# Patient Record
Sex: FEMALE | Race: WHITE | NOT HISPANIC OR LATINO | Employment: OTHER | URBAN - METROPOLITAN AREA
[De-identification: names, ages, dates, MRNs, and addresses within clinical notes are randomized per-mention and may not be internally consistent; named-entity substitution may affect disease eponyms.]

---

## 2023-03-01 ENCOUNTER — MEDICAL CORRESPONDENCE (OUTPATIENT)
Dept: HEALTH INFORMATION MANAGEMENT | Facility: CLINIC | Age: 88
End: 2023-03-01

## 2023-03-03 ENCOUNTER — TRANSCRIBE ORDERS (OUTPATIENT)
Dept: OTHER | Age: 88
End: 2023-03-03

## 2023-03-03 DIAGNOSIS — K22.5 ZENKER DIVERTICULUM: Primary | ICD-10-CM

## 2023-03-06 ENCOUNTER — TELEPHONE (OUTPATIENT)
Dept: GASTROENTEROLOGY | Facility: CLINIC | Age: 88
End: 2023-03-06

## 2023-03-06 ENCOUNTER — DOCUMENTATION ONLY (OUTPATIENT)
Dept: GASTROENTEROLOGY | Facility: CLINIC | Age: 88
End: 2023-03-06

## 2023-03-06 NOTE — PROGRESS NOTES
Called [facility] to request that they push images to [employer] PACS. Faxed request per clinic protocol.    Images requested:   US THYROID/PARATHYROID (02/03/2023)  CT CHEST ABDOMEN PELVIS W (01/18/2023)    Clinic Information:  [facility]    [address] [place] [address]    Phone #: [phone]    Fax #: [phone] sk

## 2023-03-06 NOTE — TELEPHONE ENCOUNTER
Advanced Endoscopy     Referring provider: Eduardo Hernandez MD @ [facility]  Ph: [phone] Fx: [phone]    Referred to: Advanced Endoscopy Provider Group     Provider Requested: Dr Edouard     Referral Received: 3/3/23     Records received: CE    EGD 3/2/23 - imaging in the report in CE  Impression:            - No specimens collected.   - Large food bolus in upper esophagus, partially removed with Rat tooth forceps and Holt net.   - Due to difficulty navigating upper esophageal lumen,  decision made to terminate procedure in order to transfer to [facility] for advanced   endoscopic techniques.  Recommendation:        - We will arrange for transfer to [facility]. Strict NPO    Thyroid US 2/3/23  IMPRESSION:   1. Diffusely heterogeneous and hyperemic thyroid gland, Hashimoto thyroiditis suspected.   2. There are 2 adjacent TR 4 nodules in the inferior right lobe measuring 1.8 and 1.6 cm. Ultrasound FNA recommended.   ACR TI-RADS     CT CAP 1/18/23  Impression  1. Large hiatal hernia containing the entire stomach and a portion of the transverse colon.   2. Enlarged, nodular right thyroid lobe. Recommend thyroid ultrasound.   3. Very mild bronchiectasis.        Images received: PACs    Evaluation for:  Zenker diverticulum    Clinical History (per RN review):       [facility]     Please schedule with: Dr. Teresa Garrett Pritchard    Esophagram Needed? Yes  Ordered per protocol if no esophagram found within the last 12 months    Diagnosis: Hiatal hernia    NOTE: CT w/ entire stomach above diaphragm and some of transverse colon, patient with symptoms of bothersome gagging/mucous. EGD ordered.     MD review date:   MD Decision for clinic consultation/Orders:            Referral updates/Patient contacted:

## 2023-03-09 ENCOUNTER — PATIENT OUTREACH (OUTPATIENT)
Dept: GASTROENTEROLOGY | Facility: CLINIC | Age: 88
End: 2023-03-09
Payer: COMMERCIAL

## 2023-03-09 DIAGNOSIS — K22.5 ZENKER'S DIVERTICULUM: Primary | ICD-10-CM

## 2023-03-09 NOTE — PROGRESS NOTES
She'll need a swallow study with esophagram, clinic, then probably an EGD with Zenker's diverticulotomy.     Please assist in scheduling:     Procedure/Imaging/Clinic: EGD with Zenker's diverticulotomy and NG tube placement   Physician:  Javan   Timing: next available   Procedure length: 60 min   Anesthesia: Gen   Dx: Zenker's diverticulum   Tier: 3   Location: [facility] OR

## 2023-03-21 ENCOUNTER — PREP FOR PROCEDURE (OUTPATIENT)
Dept: GASTROENTEROLOGY | Facility: CLINIC | Age: 88
End: 2023-03-21
Payer: COMMERCIAL

## 2023-03-21 DIAGNOSIS — K22.5 ZENKER'S DIVERTICULUM: Primary | ICD-10-CM

## 2023-03-21 NOTE — PROGRESS NOTES
Pt's daughter Therese called and left  with request to discuss follow up with Dr Edouard. Called her back and left . Pt currently is scheduled for esophagram, video swallow study with SLP on 3/23. Clinic with Dr Edouard on June 7th.       Pt's daughter returned call. In agreement with above plan for imaging and clinic, would like to move in person clinic on 6/7 back to 9:40am. Message routed to clinic coordinators.    Also discussed tentatively scheduling procedure to follow. She understands that pt will be admitted for observation overnight following and will need a  the next day. Agreed upon June 26th for procedure    Patient needs to get pre-op physical completed. If outside  health system will need physical faxed to number [phone]   If you do not get a preop physical, your procedure could be cancelled, patient voiced understanding*    Preop Plan: Pt daughter will arrange to have this done with [facility] provider within 30 days of procedure date.    Does patient have any history of gastric bypass/gastric surgery/altered panc/bili anatomy?    Med Review    Blood thinner -    ASA -   Diabetic -     Patient Education r/t procedure:  [email or web address]    A pre-op nurse will call 1-2 days prior to the procedure.    NPO/Prep:   Will discuss in more detail at clinic, will include in written education letter sent to email    Verbalized understanding of all instructions. All questions answered.     Procedure order placed, message routed to OR      Tara Tejeda, RN, BSN,   Advanced Gastroenterology  Care coordinator

## 2023-03-23 ENCOUNTER — HOSPITAL ENCOUNTER (OUTPATIENT)
Dept: RADIOLOGY | Facility: HOSPITAL | Age: 88
Discharge: HOME | End: 2023-03-23
Attending: INTERNAL MEDICINE
Payer: COMMERCIAL

## 2023-03-23 ENCOUNTER — HOSPITAL ENCOUNTER (OUTPATIENT)
Dept: SPEECH THERAPY | Facility: HOSPITAL | Age: 88
Discharge: HOME | End: 2023-03-23
Attending: INTERNAL MEDICINE
Payer: COMMERCIAL

## 2023-03-23 DIAGNOSIS — K22.5 ZENKER'S DIVERTICULUM: ICD-10-CM

## 2023-03-23 PROCEDURE — 74221 X-RAY XM ESOPHAGUS 2CNTRST: CPT

## 2023-03-23 PROCEDURE — 250N000013 HC RX MED GY IP 250 OP 250 PS 637: Performed by: INTERNAL MEDICINE

## 2023-03-23 PROCEDURE — 92610 EVALUATE SWALLOWING FUNCTION: CPT | Mod: GN

## 2023-03-23 PROCEDURE — 74230 X-RAY XM SWLNG FUNCJ C+: CPT

## 2023-03-23 PROCEDURE — 92611 MOTION FLUOROSCOPY/SWALLOW: CPT | Mod: GN

## 2023-03-23 PROCEDURE — 92526 ORAL FUNCTION THERAPY: CPT | Mod: GN

## 2023-03-23 RX ADMIN — ANTACID/ANTIFLATULENT 4 G: 380; 550; 10; 10 GRANULE, EFFERVESCENT ORAL at 09:55

## 2023-03-23 NOTE — PROGRESS NOTES
Speech Pathology Video Swallow Study       03/23/23 3379   General Information   Type Of Visit Initial   Start Of Care Date 03/23/23   Referring Physician Patrick Romero MD   Orders Evaluate And Treat   Medical Diagnosis Zenker's Diverticulum   Onset Of Illness/injury Or Date Of Surgery 03/02/23   Pertinent History of Current Problem/OT: Additional Occupational Profile Info 87 yr old female with reports of coughing up phlegm. Recent EGD performed 3/2/23 with note results of: A Zenker's diverticulum with a large opening and a small amount of   impacted food was found. The esophagus was at the 3 o'clock position and   was accessed with the pediatric gastroscope.  A large hiatal hernia was present measuring at least 7 cm in length   (likely larger as the stomach was reduced into the chest when the scope   was initially advanced to the duodenum).  The stomach was normal.  The examined duodenum was normal. She was referred to GI and probable endoscopic Zenker diverticulotomy, which is scheduled for June 2023.   Respiratory Status Room air   General Observations Pt is alert and following directions, she requests that her daughter be present after the procedure for follow up discussion.   Patient/family Goals to further evaluate Zenkers diverticulum and hiatal hernia for treatment options   General Information Comments Patients daughter reports frequent coughing while eating particularly while drinking liquids.   Abuse Screen (yes response referral indicated)   Physical Signs of Abuse Present no   Clinical Swallow Evaluation   Oral Musculature generally intact   Structural Abnormalities none present   Dentition present and adequate   Mucosal Quality good   Mandibular Strength and Mobility intact   Oral Labial Strength and Mobility WFL   Lingual Strength and Mobility WFL   Velar Elevation intact   Buccal Strength and Mobility intact   Laryngeal Function Voicing initiated   Additional Documentation No   Additional  evaluation(s) completed today Yes   Rationale for completing additional evaluation instrumental assessment of swallow function and impact of Zenkers diverticulum   VFSS Evaluation   VFSS Additional Documentation Yes   VFSS Eval: Radiology   Radiologist Dr Blair   Views Taken left lateral;A/P   Physical Location of Procedure [facility]   VFSS Eval: Thin Liquid Texture Trial   Mode of Presentation, Thin Liquid cup;self-fed   Order of Presentation 1   Preparatory Phase WFL   Oral Phase, Thin Liquid Premature pharyngeal entry   Pharyngeal Phase, Thin Liquid UES dysfunction   Rosenbek's Penetration Aspiration Scale: Thin Liquid Trial Results 2 - contrast enters airway, remains above the vocal cords, no residue remains (penetration)   Diagnostic Statement Swallow triggers as the bolus passes the tip of the epiglottis. Hyolaryngeal elevation in intact, excursion is reduced. Epiglottic inversion is complete. There is a large Zenkers Diverticulum that fills with barium. No laryngeal penetration or aspiration occurs during the swallow, however, there is kickback of barium from the Zenkers diverticulum into the pharynx with shallow transient laryngeal penetration. Zenkers diverticulum is position to the left upon AP view.   VFSS Eval: Slightly Thick Liquids   Mode of Presentation cup;self-fed   Order of Presentation 2   Preparatory Phase WFL   Oral Phase Premature pharyngeal entry   Pharyngeal Phase UES dysfunction   Rosenbek's Penetration Aspiration Scale 7 - contrast passes glottis, visable residue remains despite patient's response   Response to Aspiration unproductive reflexive involuntary cough/throat clear   Diagnostic Statement Diverticulum was filled with previous thin liquid barium, with additional multiple bolus trials of slightly thick, there was continued kickback into the pharynx resulting in aspiration to the right bronchial branch, Patient demonstrated intermittent cough and throat clear but was  unable to fully eject aspirated material.   Educational Assessment   Barriers to Learning Cognitive   Preferred Learning Style Listening;Pictures/video;Other  (famly present to help with understanding and carryover)   Esophageal Phase of Swallow   Patient reports or presents with symptoms of esophageal dysphagia Yes   Esophageal comments see radiologists esophagram report   General Therapy Interventions   Planned Therapy Interventions Dysphagia Treatment   Dysphagia treatment Instruction of safe swallow strategies   Swallow Eval: Clinical Impressions   Skilled Criteria for Therapy Intervention Skilled criteria met.  Treatment indicated.   Treatment Diagnosis Zenkers Diverticulum   Diet texture recommendations Easy to Chew diet (level 7);Thin liquids (level 0)   Recommended Feeding/Eating Techniques maintain upright posture during/after eating for 30 mins;small sips/bites;other (see comments)  (eat several smaller meal rather than fewer larger meals)   Rehab Potential good, to achieve stated therapy goals   Therapy Frequency other (see comments)  (eval and 1 treatment)   Predicted Duration of Therapy Intervention (days/wks) 1 day   Risks and Benefits of Treatment have been explained. Yes   Patient, family and/or staff in agreement with Plan of Care Yes   Clinical Impression Comments Patient demonstrates no oral dysphagia. Swallow is timely and consistent with patients age. Hyolaryngeal excursion is reduced, however epiglottic inversion is complete and airway closure is achieved during the swallow. There is a large Zenkers diverticulum position to the left of the esophagus. Diverticulum fills with barium and shows repeated kickback of material into the pharynx. With continued intake the diverticulum does not clear and material  is regurgitated into the pharynx where there is laryngeal penetration and repeated aspiration. This can be exacerbated with coughing and hiatal hernia which pushes residual material upward.  Patient is at risk for persistant aspiration of food and liquid, food impaction in the Zenker's diverticulum, and aspiration related illness. See radiologist report for additional information and esophagram results. Zenker's diverticulotomy may improve bolus transition, reduce risk of aspiration, and improve nutritional intake.   Swallow Goals   SLP Swallow Goals 1   Swallow Goal 1   Goal Identifier Swallow strategies   Goal Description Patient and family will verbalize understanding of swallow strategies that my reduce the instances of aspiration and promote comfort/clearance of food/liquid threw the pharynx and esophagus.   Goal Progress Patient and her daughter able to verbalize understanding of swallow strategies until follow up with referring provider for next steps. Kayode needs reinforcement to recall and follow through, which is provided by her family.   Target Date 03/23/23   Date Met 03/23/23   Total Session Time   SLP Eval: VideoFluoroscopic Swallow function Minutes (15037) 20   Total Evaluation Time 20   Therapy Certification   Certification date from 03/23/23   Certification date to 03/23/23   Medical Diagnosis pharyngeal dysphagia, Zenkers divertiulum   Certification I certify the need for these services furnished under this plan of treatment and while under my care.  (Physician co-signature of this document indicates review and certification of the therapy plan).                                                                           [facility]          OUTPATIENT SWALLOW  EVALUATION  PLAN OF TREATMENT FOR OUTPATIENT REHABILITATION  (COMPLETE FOR INITIAL CLAIMS ONLY)  Patient's Last Name, First Name, M.I.  YOB: 1935  Manuel Romero     Provider's Name   [facility]   Medical Record No.  [id number]     Start of Care Date:  03/23/23   Onset Date:  03/02/23   Type:     ___PT   ____OT  ___X_SLP Medical Diagnosis:  (P) pharyngeal dysphagia,  Zenkers divertiulum     Treatment Diagnosis:  (P) Zenkers Diverticulum Visits from SOC:  1     _________________________________________________________________________________  Plan of Treatment/Functional Goals:  Planned Therapy Interventions: (P) Dysphagia Treatment  Dysphagia treatment: (P) Instruction of safe swallow strategies                     Goals   1. Goal Identifier: (P) Swallow strategies       Goal Description: (P) Patient and family will verbalize understanding of swallow strategies that my reduce the instances of aspiration and promote comfort/clearance of food/liquid threw the pharynx and esophagus.       Target Date: (P) 03/23/23       Date Met: (P) 03/23/23   2.                             3.                             4.                             5.                            6.                              7.                              8.                              Therapy Frequency: (P) other (see comments) (eval and 1 treatment)  Predicted Duration of Therapy Intervention (days/wks): (P) 1 day    Darrin Faye, SLP       I CERTIFY THE NEED FOR THESE SERVICES FURNISHED UNDER        THIS PLAN OF TREATMENT AND WHILE UNDER MY CARE     (Physician co-signature of this document indicates review and certification of the therapy plan).                Certification date from: (P) 03/23/23 Certification date to: (P) 03/23/23          Referring Physician: Patrick Romero MD    Initial Assessment        See Epic Evaluation Start Of Care Date: 03/23/23

## 2023-06-01 ENCOUNTER — DOCUMENTATION ONLY (OUTPATIENT)
Dept: GASTROENTEROLOGY | Facility: CLINIC | Age: 88
End: 2023-06-01
Payer: COMMERCIAL

## 2023-06-01 NOTE — PROGRESS NOTES
Called PT's daughter and left VM.     Called to remind patient of their upcoming appointment with our GI clinic, on 06/07/23 at 9:40 AM with Dr. Patrick Edouard. This appointment is scheduled as an in-person appt. Please arrive 15 minutes early to check in for your appointment. , if your appointment is virtual (video or telephone) you need to be in [place] for the visit. To reschedule or cancel patient to call [phone].      SK

## 2023-06-01 NOTE — PROGRESS NOTES
Patient called and confirmed their upcoming appointment with our GI clinic, on 06/07/23 at 9:40 AM with Dr. Patrick Edouard. This appointment is scheduled as an in-person appt. Please arrive 15 minutes early to check in for your appointment. , if your appointment is virtual (video or telephone) you need to be in [place] for the visit. To reschedule or cancel patient to call [phone].        SK

## 2023-06-05 ENCOUNTER — PREP FOR PROCEDURE (OUTPATIENT)
Dept: GASTROENTEROLOGY | Facility: CLINIC | Age: 88
End: 2023-06-05
Payer: COMMERCIAL

## 2023-06-07 ENCOUNTER — OFFICE VISIT (OUTPATIENT)
Dept: GASTROENTEROLOGY | Facility: CLINIC | Age: 88
End: 2023-06-07
Attending: INTERNAL MEDICINE
Payer: COMMERCIAL

## 2023-06-07 ENCOUNTER — PATIENT OUTREACH (OUTPATIENT)
Dept: GASTROENTEROLOGY | Facility: CLINIC | Age: 88
End: 2023-06-07

## 2023-06-07 VITALS
TEMPERATURE: 97.8 F | WEIGHT: 116.9 LBS | HEART RATE: 57 BPM | SYSTOLIC BLOOD PRESSURE: 159 MMHG | BODY MASS INDEX: 23.56 KG/M2 | RESPIRATION RATE: 16 BRPM | OXYGEN SATURATION: 95 % | DIASTOLIC BLOOD PRESSURE: 75 MMHG | HEIGHT: 59 IN

## 2023-06-07 DIAGNOSIS — K22.5 ZENKER DIVERTICULUM: ICD-10-CM

## 2023-06-07 PROBLEM — D04.39 SQUAMOUS CELL CARCINOMA IN SITU OF SKIN OF NOSE: Status: ACTIVE | Noted: 2021-03-03

## 2023-06-07 PROBLEM — L57.0 ACTINIC KERATOSIS: Status: ACTIVE | Noted: 2021-01-19

## 2023-06-07 PROBLEM — D04.61: Status: ACTIVE | Noted: 2021-10-11

## 2023-06-07 PROCEDURE — 99204 OFFICE O/P NEW MOD 45 MIN: CPT | Mod: GC | Performed by: INTERNAL MEDICINE

## 2023-06-07 PROCEDURE — G0463 HOSPITAL OUTPT CLINIC VISIT: HCPCS | Performed by: INTERNAL MEDICINE

## 2023-06-07 RX ORDER — VIT A/VIT C/VIT E/ZINC/COPPER 2148-113
1 TABLET ORAL DAILY
COMMUNITY
Start: 2023-01-12

## 2023-06-07 RX ORDER — LEVOTHYROXINE SODIUM 100 UG/1
TABLET ORAL
Status: ON HOLD | COMMUNITY
End: 2023-06-26

## 2023-06-07 ASSESSMENT — PAIN SCALES - GENERAL: PAINLEVEL: NO PAIN (0)

## 2023-06-07 NOTE — PROGRESS NOTES
INTERVENTIONAL ENDOSCOPY OUTPATIENT CLINIC CONSULT  DATE OF SERVICE: 6/7/2023  PROVIDER REQUESTING CONSULT: Eduardo Hernandez  Reason for Consultation: Endoscopic treatment of Zencker's diverticulum     ASSESSMENT:    87 y F with hypothyroidism, hiatal hernia, recently diagnosed Zencker's diverticulum who was referred for evaluation for flexible endoscopic septotomy.     Current symptoms include copious amounts of phlegm production especially at night time along with halitosis. She is s/p video swallow study and esophagogram. There is evidence of a 2.5 cm x 3 cm Zenker's diverticulum on esophagogram with radiographic evidence of aspiration into the right lower lobe.     We discussed about flexible endoscopic therapy for Zencker's diverticulum. I explained about the potential risks with the procedure including infection, bleeding, perforation with potential need for surgical repair (if needed). Pt and her daughters verbalized understanding this and are in agreement with the plan of treatment.     RECOMMENDATIONS:  -- EGD for cricopharyngeal septotomy        Thank you for this consultation.  It was a pleasure to participate in the care of this patient; please contact us with any further questions.  A total of 30 minutes was spent in face to face evaluation with this patient, of which was included chart review, history and exam, documentation, counseling, coordinating a management plan and further activities as noted above for this patient on the date of the encounter.     Shadi Lala MD on 6/7/2023 at 10:31 AM      Patrick Edouard MD  [facility]  Division of Gastroenterology and Hepatology  [address] [address]    ________________________________________________________________  HPI:  87 y F with hypothyroidism, hiatal hernia, recently diagnosed Zencker's diverticulum who was referred for evaluation for flexible endoscopic septotomy.     Pt is  accompanied by her two daughters. Over the past one to 1 1/2 year, she has been experiencing cough while eating and producing copious amounts of phlegm especially at night time. These symptoms are associated with halitosis.     Due to progressively worsening symptoms, she was evaluated by GI provider in [facility], underwent EGD on 3/2/23 which showed evidence of large food bolus in the upper esophagus consistent with Zencker's diverticulum with retrained food bolus  along with a large hiatal hernia.     Pt was then referred for advanced endoscopic treatment of Zencker's diverticulum.     Patient denies jaundice, abdominal distension, lower extremity edema, lethargy or confusion.  No history of melena, hematemesis or hematochezia.  Patient denies fevers, sweats, chills or weight loss.    PMHx:  No past medical history on file.  There is no problem list on file for this patient.      PSurgHx:  No past surgical history on file.    MEDS:  No current outpatient medications on file.     No current facility-administered medications for this visit.     ALLERGIES:  Not on File  FHx:No family history on file.    SOCIAL Hx:  Social History     Socioeconomic History     Marital status:      Spouse name: Not on file     Number of children: Not on file     Years of education: Not on file     Highest education level: Not on file   Occupational History     Not on file   Tobacco Use     Smoking status: Not on file     Smokeless tobacco: Not on file   Substance and Sexual Activity     Alcohol use: Not on file     Drug use: Not on file     Sexual activity: Not on file   Other Topics Concern     Not on file   Social History Narrative     Not on file     Social Determinants of Health     Financial Resource Strain: Not on file   Food Insecurity: Not on file   Transportation Needs: Not on file   Physical Activity: Not on file   Stress: Not on file   Social Connections: Not on file   Intimate Partner Violence: Not on file   Housing  Stability: Not on file       ROS: A comprehensive Review of Systems was asked and answered in the negative unless specifically commented upon in the HPI    Physical Exam  There were no vitals taken for this visit.  There is no height or weight on file to calculate BMI.     Gen: A&Ox3, NAD  HEENT: Moist mucus membranes, no scleral icterus.  Lungs: no respiratory distress  Abd: Non distended  Skin: no jaundice, no stigmata of chronic liver disease  Ext: warm, dry, no evidence of edema    LABS:  No results found for any previous visit.     No results found for: WBC, HGB, HCT, MCV, PLT    IMAGING:  No results found for this or any previous visit.        Endoscopies:  No results found for this or any previous visit.

## 2023-06-07 NOTE — LETTER
6/7/2023         RE: Manuel Romero  [address]  [facility] [address]        Dear Colleague,    Thank you for referring your patient, Manuel Romero, to the [facility]. Please see a copy of my visit note below.    INTERVENTIONAL ENDOSCOPY OUTPATIENT CLINIC CONSULT  DATE OF SERVICE: 6/7/2023  PROVIDER REQUESTING CONSULT: Eduardo Hernandez  Reason for Consultation: Endoscopic treatment of Zencker's diverticulum     ASSESSMENT:    87 y F with hypothyroidism, hiatal hernia, recently diagnosed Zencker's diverticulum who was referred for evaluation for flexible endoscopic septotomy.     Current symptoms include copious amounts of phlegm production especially at night time along with halitosis. She is s/p video swallow study and esophagogram. There is evidence of a 2.5 cm x 3 cm Zenker's diverticulum on esophagogram with radiographic evidence of aspiration into the right lower lobe.     We discussed about flexible endoscopic therapy for Zencker's diverticulum. I explained about the potential risks with the procedure including infection, bleeding, perforation with potential need for surgical repair (if needed). Pt and her daughters verbalized understanding this and are in agreement with the plan of treatment.     RECOMMENDATIONS:  -- EGD for cricopharyngeal septotomy        Thank you for this consultation.  It was a pleasure to participate in the care of this patient; please contact us with any further questions.  A total of 30 minutes was spent in face to face evaluation with this patient, of which was included chart review, history and exam, documentation, counseling, coordinating a management plan and further activities as noted above for this patient on the date of the encounter.     Shadi Lala MD on 6/7/2023 at 10:31 AM      Patrick Edouard MD  [facility]  Division of Gastroenterology and Hepatology  [facility] [address] [facility] [address]  [address]  [place], [place] [address]    ________________________________________________________________  HPI:  87 y F with hypothyroidism, hiatal hernia, recently diagnosed Zencker's diverticulum who was referred for evaluation for flexible endoscopic septotomy.     Pt is accompanied by her two daughters. Over the past one to 1 1/2 year, she has been experiencing cough while eating and producing copious amounts of phlegm especially at night time. These symptoms are associated with halitosis.     Due to progressively worsening symptoms, she was evaluated by GI provider in community, underwent EGD on 3/2/23 which showed evidence of large food bolus in the upper esophagus consistent with Zencker's diverticulum with retrained food bolus  along with a large hiatal hernia.     Pt was then referred for advanced endoscopic treatment of Zencker's diverticulum.     Patient denies jaundice, abdominal distension, lower extremity edema, lethargy or confusion.  No history of melena, hematemesis or hematochezia.  Patient denies fevers, sweats, chills or weight loss.    PMHx:  No past medical history on file.  There is no problem list on file for this patient.      PSurgHx:  No past surgical history on file.    MEDS:  No current outpatient medications on file.     No current facility-administered medications for this visit.     ALLERGIES:  Not on File  FHx:No family history on file.    SOCIAL Hx:  Social History     Socioeconomic History    Marital status:      Spouse name: Not on file    Number of children: Not on file    Years of education: Not on file    Highest education level: Not on file   Occupational History    Not on file   Tobacco Use    Smoking status: Not on file    Smokeless tobacco: Not on file   Substance and Sexual Activity    Alcohol use: Not on file    Drug use: Not on file    Sexual activity: Not on file   Other Topics Concern    Not on file   Social History Narrative    Not on file     Social Determinants of  Health     Financial Resource Strain: Not on file   Food Insecurity: Not on file   Transportation Needs: Not on file   Physical Activity: Not on file   Stress: Not on file   Social Connections: Not on file   Intimate Partner Violence: Not on file   Housing Stability: Not on file       ROS: A comprehensive Review of Systems was asked and answered in the negative unless specifically commented upon in the HPI    Physical Exam  There were no vitals taken for this visit.  There is no height or weight on file to calculate BMI.     Gen: A&Ox3, NAD  HEENT: Moist mucus membranes, no scleral icterus.  Lungs: no respiratory distress  Abd: Non distended  Skin: no jaundice, no stigmata of chronic liver disease  Ext: warm, dry, no evidence of edema    LABS:  No results found for any previous visit.     No results found for: WBC, HGB, HCT, MCV, PLT    IMAGING:  No results found for this or any previous visit.        Endoscopies:  No results found for this or any previous visit.      Attestation signed by Patrick Edouard MD at 6/7/2023  2:07 PM:  This patient has been seen and evaluated by me and discussed the management with Dr. Shadi Lala on 06/07/23. I reviewed the patient's clinical course, laboratory data, and radiographic imaging. I agree with the documented findings and plan of care as outlined.    I reviewed the EGD report, swallow study images and esophagram images. Patient has symptomatic Zenker's diverticulum. Large hiatal hernia but suspect symptoms really just from Zenker's which is supported by the swallow study findings showing reflux form the diverticulum. We discussed the flexible endoscopic Zenker's diverticulotomy/septotomy approach including risks. All questions answered and they would like to proceed.    Patrick Edouard MD  [facility]  Division of Gastroenterology and Hepatology  [facility] [address] [place], [place] [address]  Pager [phone]

## 2023-06-07 NOTE — NURSING NOTE
"Oncology Rooming Note    June 7, 2023 9:50 AM   Manuel Romero is a 87 year old female who presents for:    Chief Complaint   Patient presents with     New Patient     Zenker diverticulum      Initial Vitals: BP (!) 159/75   Pulse 57   Temp 97.8  F (36.6  C) (Tympanic)   Resp 16   Ht 1.507 m (4' 11.33\")   Wt 53 kg (116 lb 14.4 oz)   SpO2 95%   BMI 23.35 kg/m   Estimated body mass index is 23.35 kg/m  as calculated from the following:    Height as of this encounter: 1.507 m (4' 11.33\").    Weight as of this encounter: 53 kg (116 lb 14.4 oz). Body surface area is 1.49 meters squared.  No Pain (0) Comment: Data Unavailable   No LMP recorded. Patient has had a hysterectomy.  Allergies reviewed: Yes  Medications reviewed: Yes    Medications: Medication refills not needed today.  Pharmacy name entered into [facility]: [facility] [address] [place] [address]    Clinical concerns: new patient.        Lali Espinal CMA            "

## 2023-06-07 NOTE — TELEPHONE ENCOUNTER
Left VM on Therese's line to follow up after clinic. Will need pre op exam completed, which daughter stated would be done at [facility]. Will confirm pt not taking blood thinners or DM medications.  Need to discuss follow up visit, currently holding 8/9/23 at 7:40am    1620  Therese called back, does have pre op planned for next week at [facility]. Pt is not on blood thinners or diabetic meds.  In agreement with virtual visit on 8/9, ask that other daughter Darrin Chang, but contacted for the visit using  #[phone]    Message routed to clinic coordinators to schedule    Tara Tejeda, RN, BSN,   [facility]  Care coordinator

## 2023-06-07 NOTE — PATIENT INSTRUCTIONS
You will find a brief summary of your discussion and care plan from today's visit below.  Dr Edouard has outlined the following steps after your recent clinic visit:    RECOMMENDATIONS:  -- EGD for cricopharyngeal septotomy scheduled for 6/26/23    Please call with any questions or concerns regarding your clinic visit today.     It is a pleasure being involved in your health care.     Contacts post-consultation depending on your need:     Schedule Clinic Appointments                        [phone], option 1    Renetta Tejeda RN Care Coordinator           [phone]     Jn Lynch OR                           [phone]     GI Procedure Scheduling                               [phone], option 2     For urgent/emergent questions after business hours, you may reach the on-call GI Fellow by contacting the [facility]  at [phone].     How do I schedule labs, imaging studies, or procedures that were ordered in clinic today?      Labs: To schedule lab appointment at the Clinic and Surgery Center, use my chart or call [phone]. If you have a [place] lab closer to home where you are regularly seen you can give them a call.      Procedures: If a colonoscopy, upper endoscopy, breath test, esophageal manometry, or pH impedence was ordered today, our endoscopy team will call you to schedule this. If you have not heard from our endoscopy team within a week, please call [phone] to schedule.      Imaging Studies: If you were scheduled for a CT scan, X-ray, MRI, ultrasound, HIDA scan or other imaging study, please call [phone] to have this scheduled.      Referral: If a referral to another specialty was ordered, expect a phone call or follow instructions above. If you have not heard from anyone regarding your referral in a week, please call our clinic to check the status.      How to I schedule a follow-up visit?  If you did not schedule a follow-up visit today, please  call [phone] option #5 to schedule a follow-up office visit.      I recommend signing up for [employer] access if you have not already done so and are comfortable with using a computer.  This allows for online access to your lab results and also helps you communicate efficiently with the clinic should any questions arise in your care.

## 2023-06-26 ENCOUNTER — ANESTHESIA (OUTPATIENT)
Dept: SURGERY | Facility: CLINIC | Age: 88
DRG: 391 | End: 2023-06-26
Payer: COMMERCIAL

## 2023-06-26 ENCOUNTER — ANESTHESIA EVENT (OUTPATIENT)
Dept: SURGERY | Facility: CLINIC | Age: 88
DRG: 391 | End: 2023-06-26
Payer: COMMERCIAL

## 2023-06-26 ENCOUNTER — HOSPITAL ENCOUNTER (INPATIENT)
Facility: CLINIC | Age: 88
LOS: 3 days | Discharge: HOME | DRG: 391 | End: 2023-06-29
Attending: INTERNAL MEDICINE | Admitting: STUDENT IN AN ORGANIZED HEALTH CARE EDUCATION/TRAINING PROGRAM
Payer: COMMERCIAL

## 2023-06-26 ENCOUNTER — APPOINTMENT (OUTPATIENT)
Dept: GENERAL RADIOLOGY | Facility: CLINIC | Age: 88
DRG: 391 | End: 2023-06-26
Attending: INTERNAL MEDICINE
Payer: COMMERCIAL

## 2023-06-26 DIAGNOSIS — T17.908A ASPIRATION INTO AIRWAY, INITIAL ENCOUNTER: ICD-10-CM

## 2023-06-26 DIAGNOSIS — R13.10 DYSPHAGIA, UNSPECIFIED TYPE: ICD-10-CM

## 2023-06-26 DIAGNOSIS — R53.81 PHYSICAL DECONDITIONING: ICD-10-CM

## 2023-06-26 DIAGNOSIS — K22.5 ZENKER DIVERTICULUM: Primary | ICD-10-CM

## 2023-06-26 LAB
ANION GAP SERPL CALCULATED.3IONS-SCNC: 12 MMOL/L (ref 7–15)
ATRIAL RATE - MUSE: 63 BPM
BASOPHILS # BLD AUTO: 0 10E3/UL (ref 0–0.2)
BASOPHILS NFR BLD AUTO: 0 %
BUN SERPL-MCNC: 15.8 MG/DL (ref 8–23)
CALCIUM SERPL-MCNC: 9 MG/DL (ref 8.8–10.2)
CHLORIDE SERPL-SCNC: 107 MMOL/L (ref 98–107)
CREAT SERPL-MCNC: 0.64 MG/DL (ref 0.51–0.95)
DEPRECATED HCO3 PLAS-SCNC: 21 MMOL/L (ref 22–29)
DIASTOLIC BLOOD PRESSURE - MUSE: NORMAL MMHG
EOSINOPHIL # BLD AUTO: 0.1 10E3/UL (ref 0–0.7)
EOSINOPHIL NFR BLD AUTO: 1 %
ERYTHROCYTE [DISTWIDTH] IN BLOOD BY AUTOMATED COUNT: 12.9 % (ref 10–15)
GFR SERPL CREATININE-BSD FRML MDRD: 86 ML/MIN/1.73M2
GLUCOSE SERPL-MCNC: 113 MG/DL (ref 70–99)
HCT VFR BLD AUTO: 39.9 % (ref 35–47)
HGB BLD-MCNC: 13.3 G/DL (ref 11.7–15.7)
IMM GRANULOCYTES # BLD: 0.1 10E3/UL
IMM GRANULOCYTES NFR BLD: 1 %
INTERPRETATION ECG - MUSE: NORMAL
LYMPHOCYTES # BLD AUTO: 1.4 10E3/UL (ref 0.8–5.3)
LYMPHOCYTES NFR BLD AUTO: 14 %
MAGNESIUM SERPL-MCNC: 1.9 MG/DL (ref 1.7–2.3)
MCH RBC QN AUTO: 33 PG (ref 26.5–33)
MCHC RBC AUTO-ENTMCNC: 33.3 G/DL (ref 31.5–36.5)
MCV RBC AUTO: 99 FL (ref 78–100)
MONOCYTES # BLD AUTO: 0.6 10E3/UL (ref 0–1.3)
MONOCYTES NFR BLD AUTO: 6 %
NEUTROPHILS # BLD AUTO: 7.9 10E3/UL (ref 1.6–8.3)
NEUTROPHILS NFR BLD AUTO: 78 %
NRBC # BLD AUTO: 0 10E3/UL
NRBC BLD AUTO-RTO: 0 /100
P AXIS - MUSE: 6 DEGREES
PLATELET # BLD AUTO: 161 10E3/UL (ref 150–450)
POTASSIUM SERPL-SCNC: 3.9 MMOL/L (ref 3.4–5.3)
PR INTERVAL - MUSE: 154 MS
QRS DURATION - MUSE: 78 MS
QT - MUSE: 430 MS
QTC - MUSE: 440 MS
R AXIS - MUSE: -12 DEGREES
RBC # BLD AUTO: 4.03 10E6/UL (ref 3.8–5.2)
SODIUM SERPL-SCNC: 140 MMOL/L (ref 136–145)
SYSTOLIC BLOOD PRESSURE - MUSE: NORMAL MMHG
T AXIS - MUSE: -4 DEGREES
UPPER GI ENDOSCOPY: NORMAL
VENTRICULAR RATE- MUSE: 63 BPM
WBC # BLD AUTO: 10 10E3/UL (ref 4–11)

## 2023-06-26 PROCEDURE — 120N000002 HC R&B MED SURG/OB UMMC

## 2023-06-26 PROCEDURE — 80048 BASIC METABOLIC PNL TOTAL CA: CPT | Performed by: ANESTHESIOLOGY

## 2023-06-26 PROCEDURE — 250N000009 HC RX 250: Performed by: NURSE ANESTHETIST, CERTIFIED REGISTERED

## 2023-06-26 PROCEDURE — 250N000013 HC RX MED GY IP 250 OP 250 PS 637: Performed by: STUDENT IN AN ORGANIZED HEALTH CARE EDUCATION/TRAINING PROGRAM

## 2023-06-26 PROCEDURE — 0WJP8ZZ INSPECTION OF GASTROINTESTINAL TRACT, VIA NATURAL OR ARTIFICIAL OPENING ENDOSCOPIC APPROACH: ICD-10-PCS | Performed by: INTERNAL MEDICINE

## 2023-06-26 PROCEDURE — 36415 COLL VENOUS BLD VENIPUNCTURE: CPT | Performed by: ANESTHESIOLOGY

## 2023-06-26 PROCEDURE — 360N000082 HC SURGERY LEVEL 2 W/ FLUORO, PER MIN: Performed by: INTERNAL MEDICINE

## 2023-06-26 PROCEDURE — 99223 1ST HOSP IP/OBS HIGH 75: CPT | Mod: GC | Performed by: STUDENT IN AN ORGANIZED HEALTH CARE EDUCATION/TRAINING PROGRAM

## 2023-06-26 PROCEDURE — 85025 COMPLETE CBC W/AUTO DIFF WBC: CPT | Performed by: ANESTHESIOLOGY

## 2023-06-26 PROCEDURE — 710N000010 HC RECOVERY PHASE 1, LEVEL 2, PER MIN: Performed by: INTERNAL MEDICINE

## 2023-06-26 PROCEDURE — 93010 ELECTROCARDIOGRAM REPORT: CPT | Mod: 59 | Performed by: INTERNAL MEDICINE

## 2023-06-26 PROCEDURE — 258N000003 HC RX IP 258 OP 636: Performed by: STUDENT IN AN ORGANIZED HEALTH CARE EDUCATION/TRAINING PROGRAM

## 2023-06-26 PROCEDURE — 74018 RADEX ABDOMEN 1 VIEW: CPT | Mod: 26 | Performed by: RADIOLOGY

## 2023-06-26 PROCEDURE — 93005 ELECTROCARDIOGRAM TRACING: CPT

## 2023-06-26 PROCEDURE — 83735 ASSAY OF MAGNESIUM: CPT | Performed by: STUDENT IN AN ORGANIZED HEALTH CARE EDUCATION/TRAINING PROGRAM

## 2023-06-26 PROCEDURE — 250N000011 HC RX IP 250 OP 636: Performed by: STUDENT IN AN ORGANIZED HEALTH CARE EDUCATION/TRAINING PROGRAM

## 2023-06-26 PROCEDURE — 370N000017 HC ANESTHESIA TECHNICAL FEE, PER MIN: Performed by: INTERNAL MEDICINE

## 2023-06-26 PROCEDURE — 272N000001 HC OR GENERAL SUPPLY STERILE: Performed by: INTERNAL MEDICINE

## 2023-06-26 PROCEDURE — 999N000065 XR ABDOMEN PORT 1 VIEW

## 2023-06-26 PROCEDURE — C9113 INJ PANTOPRAZOLE SODIUM, VIA: HCPCS | Mod: JZ | Performed by: STUDENT IN AN ORGANIZED HEALTH CARE EDUCATION/TRAINING PROGRAM

## 2023-06-26 PROCEDURE — 258N000003 HC RX IP 258 OP 636: Performed by: NURSE ANESTHETIST, CERTIFIED REGISTERED

## 2023-06-26 PROCEDURE — 250N000011 HC RX IP 250 OP 636: Mod: JZ | Performed by: NURSE ANESTHETIST, CERTIFIED REGISTERED

## 2023-06-26 PROCEDURE — 250N000024 HC ISOFLURANE, PER MIN: Performed by: INTERNAL MEDICINE

## 2023-06-26 PROCEDURE — 250N000011 HC RX IP 250 OP 636: Performed by: NURSE ANESTHETIST, CERTIFIED REGISTERED

## 2023-06-26 PROCEDURE — 999N000141 HC STATISTIC PRE-PROCEDURE NURSING ASSESSMENT: Performed by: INTERNAL MEDICINE

## 2023-06-26 PROCEDURE — C1769 GUIDE WIRE: HCPCS | Performed by: INTERNAL MEDICINE

## 2023-06-26 RX ORDER — NALOXONE HYDROCHLORIDE 0.4 MG/ML
0.4 INJECTION, SOLUTION INTRAMUSCULAR; INTRAVENOUS; SUBCUTANEOUS
Status: DISCONTINUED | OUTPATIENT
Start: 2023-06-26 | End: 2023-06-29 | Stop reason: HOSPADM

## 2023-06-26 RX ORDER — ONDANSETRON 2 MG/ML
4 INJECTION INTRAMUSCULAR; INTRAVENOUS EVERY 30 MIN PRN
Status: DISCONTINUED | OUTPATIENT
Start: 2023-06-26 | End: 2023-06-26

## 2023-06-26 RX ORDER — CIPROFLOXACIN 2 MG/ML
INJECTION, SOLUTION INTRAVENOUS PRN
Status: DISCONTINUED | OUTPATIENT
Start: 2023-06-26 | End: 2023-06-26

## 2023-06-26 RX ORDER — HYDROMORPHONE HCL IN WATER/PF 6 MG/30 ML
0.4 PATIENT CONTROLLED ANALGESIA SYRINGE INTRAVENOUS EVERY 5 MIN PRN
Status: DISCONTINUED | OUTPATIENT
Start: 2023-06-26 | End: 2023-06-26

## 2023-06-26 RX ORDER — LEVOTHYROXINE SODIUM 75 UG/1
75 TABLET ORAL
Status: DISCONTINUED | OUTPATIENT
Start: 2023-06-27 | End: 2023-06-29 | Stop reason: HOSPADM

## 2023-06-26 RX ORDER — PROPOFOL 10 MG/ML
INJECTION, EMULSION INTRAVENOUS PRN
Status: DISCONTINUED | OUTPATIENT
Start: 2023-06-26 | End: 2023-06-26

## 2023-06-26 RX ORDER — FENTANYL CITRATE 50 UG/ML
50 INJECTION, SOLUTION INTRAMUSCULAR; INTRAVENOUS EVERY 5 MIN PRN
Status: DISCONTINUED | OUTPATIENT
Start: 2023-06-26 | End: 2023-06-26

## 2023-06-26 RX ORDER — FENTANYL CITRATE 50 UG/ML
INJECTION, SOLUTION INTRAMUSCULAR; INTRAVENOUS PRN
Status: DISCONTINUED | OUTPATIENT
Start: 2023-06-26 | End: 2023-06-26

## 2023-06-26 RX ORDER — LIDOCAINE 40 MG/G
CREAM TOPICAL
Status: DISCONTINUED | OUTPATIENT
Start: 2023-06-26 | End: 2023-06-26 | Stop reason: HOSPADM

## 2023-06-26 RX ORDER — ACETAMINOPHEN 650 MG/1
650 SUPPOSITORY RECTAL EVERY 4 HOURS PRN
Status: DISCONTINUED | OUTPATIENT
Start: 2023-06-26 | End: 2023-06-29 | Stop reason: HOSPADM

## 2023-06-26 RX ORDER — SODIUM CHLORIDE, SODIUM LACTATE, POTASSIUM CHLORIDE, CALCIUM CHLORIDE 600; 310; 30; 20 MG/100ML; MG/100ML; MG/100ML; MG/100ML
INJECTION, SOLUTION INTRAVENOUS CONTINUOUS
Status: DISCONTINUED | OUTPATIENT
Start: 2023-06-26 | End: 2023-06-26 | Stop reason: HOSPADM

## 2023-06-26 RX ORDER — SODIUM CHLORIDE, SODIUM LACTATE, POTASSIUM CHLORIDE, CALCIUM CHLORIDE 600; 310; 30; 20 MG/100ML; MG/100ML; MG/100ML; MG/100ML
INJECTION, SOLUTION INTRAVENOUS CONTINUOUS PRN
Status: DISCONTINUED | OUTPATIENT
Start: 2023-06-26 | End: 2023-06-26

## 2023-06-26 RX ORDER — HYDROMORPHONE HCL IN WATER/PF 6 MG/30 ML
0.2 PATIENT CONTROLLED ANALGESIA SYRINGE INTRAVENOUS EVERY 5 MIN PRN
Status: DISCONTINUED | OUTPATIENT
Start: 2023-06-26 | End: 2023-06-26

## 2023-06-26 RX ORDER — ONDANSETRON 4 MG/1
4 TABLET, ORALLY DISINTEGRATING ORAL EVERY 30 MIN PRN
Status: DISCONTINUED | OUTPATIENT
Start: 2023-06-26 | End: 2023-06-26

## 2023-06-26 RX ORDER — METRONIDAZOLE 500 MG/100ML
500 INJECTION, SOLUTION INTRAVENOUS EVERY 12 HOURS
Status: DISCONTINUED | OUTPATIENT
Start: 2023-06-26 | End: 2023-06-28

## 2023-06-26 RX ORDER — ACETAMINOPHEN 325 MG/10.15ML
650 LIQUID ORAL EVERY 4 HOURS PRN
Status: DISCONTINUED | OUTPATIENT
Start: 2023-06-26 | End: 2023-06-29 | Stop reason: HOSPADM

## 2023-06-26 RX ORDER — LIDOCAINE 40 MG/G
CREAM TOPICAL
Status: DISCONTINUED | OUTPATIENT
Start: 2023-06-26 | End: 2023-06-29 | Stop reason: HOSPADM

## 2023-06-26 RX ORDER — METRONIDAZOLE 500 MG/100ML
INJECTION, SOLUTION INTRAVENOUS PRN
Status: DISCONTINUED | OUTPATIENT
Start: 2023-06-26 | End: 2023-06-26

## 2023-06-26 RX ORDER — FENTANYL CITRATE 50 UG/ML
25 INJECTION, SOLUTION INTRAMUSCULAR; INTRAVENOUS EVERY 5 MIN PRN
Status: DISCONTINUED | OUTPATIENT
Start: 2023-06-26 | End: 2023-06-26

## 2023-06-26 RX ORDER — ONDANSETRON 2 MG/ML
INJECTION INTRAMUSCULAR; INTRAVENOUS PRN
Status: DISCONTINUED | OUTPATIENT
Start: 2023-06-26 | End: 2023-06-26

## 2023-06-26 RX ORDER — ONDANSETRON 2 MG/ML
4 INJECTION INTRAMUSCULAR; INTRAVENOUS EVERY 6 HOURS PRN
Status: DISCONTINUED | OUTPATIENT
Start: 2023-06-26 | End: 2023-06-26

## 2023-06-26 RX ORDER — ONDANSETRON 2 MG/ML
4 INJECTION INTRAMUSCULAR; INTRAVENOUS
Status: DISCONTINUED | OUTPATIENT
Start: 2023-06-26 | End: 2023-06-26 | Stop reason: HOSPADM

## 2023-06-26 RX ORDER — NALOXONE HYDROCHLORIDE 0.4 MG/ML
0.2 INJECTION, SOLUTION INTRAMUSCULAR; INTRAVENOUS; SUBCUTANEOUS
Status: DISCONTINUED | OUTPATIENT
Start: 2023-06-26 | End: 2023-06-29 | Stop reason: HOSPADM

## 2023-06-26 RX ORDER — CIPROFLOXACIN 2 MG/ML
400 INJECTION, SOLUTION INTRAVENOUS EVERY 12 HOURS
Status: DISCONTINUED | OUTPATIENT
Start: 2023-06-26 | End: 2023-06-28

## 2023-06-26 RX ORDER — LEVOTHYROXINE SODIUM 100 UG/1
100 TABLET ORAL
Status: DISCONTINUED | OUTPATIENT
Start: 2023-06-27 | End: 2023-06-26

## 2023-06-26 RX ORDER — PROCHLORPERAZINE MALEATE 5 MG
5 TABLET ORAL EVERY 6 HOURS PRN
Status: DISCONTINUED | OUTPATIENT
Start: 2023-06-26 | End: 2023-06-29 | Stop reason: HOSPADM

## 2023-06-26 RX ORDER — ONDANSETRON 4 MG/1
4 TABLET, ORALLY DISINTEGRATING ORAL EVERY 6 HOURS PRN
Status: DISCONTINUED | OUTPATIENT
Start: 2023-06-26 | End: 2023-06-26

## 2023-06-26 RX ORDER — LIDOCAINE HYDROCHLORIDE 20 MG/ML
INJECTION, SOLUTION INFILTRATION; PERINEURAL PRN
Status: DISCONTINUED | OUTPATIENT
Start: 2023-06-26 | End: 2023-06-26

## 2023-06-26 RX ORDER — ONDANSETRON 4 MG/1
4 TABLET, ORALLY DISINTEGRATING ORAL EVERY 6 HOURS PRN
Status: DISCONTINUED | OUTPATIENT
Start: 2023-06-26 | End: 2023-06-29 | Stop reason: HOSPADM

## 2023-06-26 RX ORDER — SODIUM CHLORIDE, SODIUM LACTATE, POTASSIUM CHLORIDE, CALCIUM CHLORIDE 600; 310; 30; 20 MG/100ML; MG/100ML; MG/100ML; MG/100ML
INJECTION, SOLUTION INTRAVENOUS CONTINUOUS
Status: DISCONTINUED | OUTPATIENT
Start: 2023-06-26 | End: 2023-06-26

## 2023-06-26 RX ORDER — ONDANSETRON 2 MG/ML
4 INJECTION INTRAMUSCULAR; INTRAVENOUS EVERY 6 HOURS PRN
Status: DISCONTINUED | OUTPATIENT
Start: 2023-06-26 | End: 2023-06-29 | Stop reason: HOSPADM

## 2023-06-26 RX ORDER — LEVOTHYROXINE SODIUM 75 UG/1
75 TABLET ORAL DAILY
COMMUNITY

## 2023-06-26 RX ORDER — SODIUM CHLORIDE, SODIUM LACTATE, POTASSIUM CHLORIDE, CALCIUM CHLORIDE 600; 310; 30; 20 MG/100ML; MG/100ML; MG/100ML; MG/100ML
INJECTION, SOLUTION INTRAVENOUS CONTINUOUS
Status: ACTIVE | OUTPATIENT
Start: 2023-06-26 | End: 2023-06-26

## 2023-06-26 RX ORDER — FLUMAZENIL 0.1 MG/ML
0.2 INJECTION, SOLUTION INTRAVENOUS
Status: ACTIVE | OUTPATIENT
Start: 2023-06-26 | End: 2023-06-26

## 2023-06-26 RX ADMIN — SODIUM CHLORIDE, POTASSIUM CHLORIDE, SODIUM LACTATE AND CALCIUM CHLORIDE: 600; 310; 30; 20 INJECTION, SOLUTION INTRAVENOUS at 11:08

## 2023-06-26 RX ADMIN — ACETAMINOPHEN 650 MG: 325 SOLUTION ORAL at 20:10

## 2023-06-26 RX ADMIN — Medication 50 MG: at 07:39

## 2023-06-26 RX ADMIN — PHENYLEPHRINE HYDROCHLORIDE 100 MCG: 10 INJECTION INTRAVENOUS at 08:25

## 2023-06-26 RX ADMIN — CIPROFLOXACIN 400 MG: 2 INJECTION INTRAVENOUS at 07:44

## 2023-06-26 RX ADMIN — SODIUM CHLORIDE, POTASSIUM CHLORIDE, SODIUM LACTATE AND CALCIUM CHLORIDE: 600; 310; 30; 20 INJECTION, SOLUTION INTRAVENOUS at 07:34

## 2023-06-26 RX ADMIN — PHENYLEPHRINE HYDROCHLORIDE 100 MCG: 10 INJECTION INTRAVENOUS at 07:54

## 2023-06-26 RX ADMIN — FENTANYL CITRATE 50 MCG: 50 INJECTION, SOLUTION INTRAMUSCULAR; INTRAVENOUS at 08:15

## 2023-06-26 RX ADMIN — LIDOCAINE HYDROCHLORIDE 100 MG: 20 INJECTION, SOLUTION INFILTRATION; PERINEURAL at 07:39

## 2023-06-26 RX ADMIN — PANTOPRAZOLE SODIUM 40 MG: 40 INJECTION, POWDER, FOR SOLUTION INTRAVENOUS at 13:19

## 2023-06-26 RX ADMIN — METRONIDAZOLE 500 MG: 500 INJECTION, SOLUTION INTRAVENOUS at 19:03

## 2023-06-26 RX ADMIN — METRONIDAZOLE 500 MG: 500 INJECTION, SOLUTION INTRAVENOUS at 07:34

## 2023-06-26 RX ADMIN — PHENYLEPHRINE HYDROCHLORIDE 100 MCG: 10 INJECTION INTRAVENOUS at 07:58

## 2023-06-26 RX ADMIN — ACETAMINOPHEN 650 MG: 325 SOLUTION ORAL at 13:20

## 2023-06-26 RX ADMIN — SUGAMMADEX 200 MG: 100 INJECTION, SOLUTION INTRAVENOUS at 08:30

## 2023-06-26 RX ADMIN — CIPROFLOXACIN 400 MG: 2 INJECTION, SOLUTION INTRAVENOUS at 20:10

## 2023-06-26 RX ADMIN — ONDANSETRON 4 MG: 2 INJECTION INTRAMUSCULAR; INTRAVENOUS at 08:27

## 2023-06-26 RX ADMIN — PANTOPRAZOLE SODIUM 40 MG: 40 INJECTION, POWDER, FOR SOLUTION INTRAVENOUS at 16:02

## 2023-06-26 RX ADMIN — PROPOFOL 100 MG: 10 INJECTION, EMULSION INTRAVENOUS at 07:39

## 2023-06-26 ASSESSMENT — ACTIVITIES OF DAILY LIVING (ADL)
DOING_ERRANDS_INDEPENDENTLY_DIFFICULTY: OTHER (SEE COMMENTS)
WALKING_OR_CLIMBING_STAIRS_DIFFICULTY: NO
CONCENTRATING,_REMEMBERING_OR_MAKING_DECISIONS_DIFFICULTY: NO
DIFFICULTY_EATING/SWALLOWING: NO
DRESSING/BATHING_DIFFICULTY: NO
ADLS_ACUITY_SCORE: 38
FALL_HISTORY_WITHIN_LAST_SIX_MONTHS: NO
VISION_MANAGEMENT: GLASSES
ADLS_ACUITY_SCORE: 35
ADLS_ACUITY_SCORE: 38
TOILETING_ISSUES: NO
ADLS_ACUITY_SCORE: 25
WEAR_GLASSES_OR_BLIND: YES
ADLS_ACUITY_SCORE: 35
ADLS_ACUITY_SCORE: 25
ADLS_ACUITY_SCORE: 25
CHANGE_IN_FUNCTIONAL_STATUS_SINCE_ONSET_OF_CURRENT_ILLNESS/INJURY: YES
ADLS_ACUITY_SCORE: 38
ADLS_ACUITY_SCORE: 25

## 2023-06-26 NOTE — PROGRESS NOTES
Admitted/transferred from:   2 RN full   skin assessment completed by Gabriela Smallwood RN and Chrissy Rudd.  Skin assessment finding: skin intact, no problems   Interventions/actions: skin interventions Repositioning     Will continue to monitor.

## 2023-06-26 NOTE — OP NOTE
Upper GI Endoscopy 06/26/2023  7:51 AM [address] [place] [address] [phone]     Endoscopy Department   _______________________________________________________________________________   Patient Name: Manuel Romero            Procedure Date: 6/26/2023 7:51 AM   MRN: [id number]                       Account Number: [id number]   YOB: 1935               Admit Type: Inpatient   Age: 87                               Room:  OR    Gender: Female                        Note Status: Finalized   Attending MD: CRYSTAL CHIN MD,      Pause for the Cause: time out performed   Total Sedation Time:                     _______________________________________________________________________________       Procedure:             Upper GI endoscopy   Indications:           For therapy of Zenker's diverticulum   Providers:             CRYSTAL CHIN MD   Referring MD:             Requesting Provider:   MARYLU ZUNIGA   Medicines:             General Anesthesia, Cipro 400 mg IV, Flagyl 500 mg IV   Complications:         No immediate complications. Estimated blood loss:                          Minimal.   _______________________________________________________________________________   Procedure:             Pre-Anesthesia Assessment:                          - Prior to the procedure, a History and Physical was                          performed, and patient medications and allergies were                          reviewed. The patient is competent. The risks and                          benefits of the procedure and the sedation options and                          risks were discussed with the patient. All questions                          were answered and informed consent was obtained.                          Patient identification and proposed procedure were                          verified by the physician, the nurse, the                           anesthesiologist and the anesthetist in the procedure                          room. Mental Status Examination: alert and oriented.                          Airway Examination: normal oropharyngeal airway and                          neck mobility. Respiratory Examination: clear to                          auscultation. CV Examination: normal. Prophylactic                          Antibiotics: The patient requires prophylactic                          antibiotics Zenker's diverticulotomy. Prior                          Anticoagulants: The patient has taken no anticoagulant                          or antiplatelet agents. ASA Grade Assessment: III - A                          patient with severe systemic disease. After reviewing                          the risks and benefits, the patient was deemed in                          satisfactory condition to undergo the procedure. The                          anesthesia plan was to use general anesthesia.                          Immediately prior to administration of medications,                          the patient was re-assessed for adequacy to receive                          sedatives. The heart rate, respiratory rate, oxygen                          saturations, blood pressure, adequacy of pulmonary                          ventilation, and response to care were monitored                          throughout the procedure. The physical status of the                          patient was re-assessed after the procedure.                          After obtaining informed consent, the endoscope was                          passed under direct vision. Throughout the procedure,                          the patient's blood pressure, pulse, and oxygen                          saturations were monitored continuously. The Endoscope                          was introduced through the mouth, and advanced to the                          second part of duodenum. The  Endoscope was introduced                          through the mouth, and advanced to the second part of                          duodenum. The upper GI endoscopy was accomplished                          without difficulty. The patient tolerated the                          procedure well.                                                                                     Findings:        A pediatric gastroscope was advanced from the left nare to the second        part of the duodenum. A ~2 cm Zenker's diverticulum was seen in the        hypopharynx. The remainder of the upper gastrointestinal tract as below.        A 12 Fr nasogastric tube was advanced over the guide wire into the        stomach to facilitate orientation of the esophageal lumen. Placement was        confirmed by scope visualization and the tube was secured to the nose        with a nasal bridal.        Adult gastroscope was then advanced back to the Zenker's        diverticulum/cricopharyngeal bar. The mucosal layer was first incised        using a standard SB knife (Endocut 3-1-1) to expose the cricopharyngeal        bar, which was then incised. Once it was felt that we reached the base        of the cricopharyngeus muscle/diverticulum dissection was stopped. The        base of the dissection was then closed with 3 hemoclips. No bleeding was        noted at the end of the procedure.        The examined duodenum was normal.        A large hiatal hernia was present.        NG slightly dislodged during extubation and had to be retracted to        remove coil in the mouth. NG tube now at 45 cm at the left nare.                                                                                     Impression:            - Normal examined duodenum.                          - Large hiatal hernia.                          - Zenker's diverticulum with prominent cricopharyngeal                          bar. NG tube placed for orientation and subsequent                           feeding. Cricopharyngeal myotomy/septotomy                          successfully performed as described above.                          - NG tube slightly dislodged at the end upon                          extubation and repositioned at the bedside.                          - No specimens collected.                          - MODIFER 22 given complexity of procedure requiring                          advanced techniques   Recommendation:        -- Monitor patient in PACU. Then admit for observation.                          - Abdominal X-ray in PACU to confirm NG tube placement                          (ordered)                          - Ice chips for comfort. Otherwise NPO.                          - Okay to use NG for nutrition and medication after                          X-ray confirmation.                          - No anticoagulation or antiplatelets for 72 hours.                          - Head of bed elevation to 30-45 degrees.                          - BID IV PPI while inpatient.                          - Continue Cipro/Flagyl IV while in the hospital until                          esophagram tomorrow.                          - IV pain control and antiemetics as needed                          - Esophagram in the morning to rule out a leak along                          with CBC and BMP. If no leak and otherwise clinically                          doing well, can discharge home. Pneumomediastinum and                          air within the pharyngeal spaces expected.                          - Liquid diet for three days starting tomorrow. Soft                          diet on day 4, 5,6. Regular diet starting next Friday                          as tolerated                          - Page Dr. Edouard directly or GI consult team for any                          clinical changes.                          - Patient will follow up with Dr. Edouard in 1 month                          - Findings  and recommendations were discussed with the                          patient and family.                                                                                       Patrick Edouard MD

## 2023-06-26 NOTE — PHARMACY-ADMISSION MEDICATION HISTORY
Pharmacist Admission Medication History    Admission medication history is complete. The information provided in this note is only as accurate as the sources available at the time of the update.    Medication reconciliation/reorder completed by provider prior to medication history? Yes    Information Source(s): [facility] via N/A    Pertinent Information: Fill history is up to date with levothyroxine and omeprazole    Changes made to PTA medication list:    Added: None    Deleted: None    Changed:   o Changed Levothyroxine 100 mcg to 75 mcg tablet (per [employer])  o Updated dose and direction for melatonin    Medication Affordability:       Allergies reviewed with patient and updates made in EHR: no    Medication History Completed By: Indio Diaz [facility] 6/26/2023 11:33 AM    Prior to Admission medications    Medication Sig Last Dose Taking? Auth Provider Long Term End Date   Cranberry 400 MG CAPS Take 1 capsule by mouth daily 6/24/2023 Yes Reported, Patient     levothyroxine (SYNTHROID/LEVOTHROID) 75 MCG tablet Take 75 mcg by mouth daily 6/24/2023 Yes Unknown, Entered By History Yes    melatonin 5 MG tablet Take 5 mg by mouth nightly as needed  Yes Reported, Patient     Multiple Vitamins-Minerals (PRESERVISION AREDS) TABS Take 1 tablet by mouth daily 6/24/2023 Yes Reported, Patient     omeprazole (PRILOSEC) 20 MG DR capsule TAKE 2 CAPSULES BY MOUTH ONCE DAILY BEFORE A MEAL 6/24/2023 Yes Reported, Patient

## 2023-06-26 NOTE — H&P
[facility]    History and Physical - Medicine Service, TONIE TEAM 2       Date of Admission:  6/26/2023    Assessment & Plan      Manuel Romero is a 87 year old female admitted on 6/26/2023. She has a history of Zenker's diverticulum and hypothyroidism and is admitted for post-operative management s/p Zenker's repair.    Zenker's diverticulum s/p cricopharyngeal myotomy/septotomy  Large hiatal hernia  Patient s/p Zenker's diverticulum procedure with GI 6/26 AM. Tolerated well. Admitted for observation overnight and evaluation for leak on POD1.   - Abdominal X-ray in PACU to confirm NG tube placement --> ok to use for meds  - Ice chips for comfort. Otherwise NPO.   - No anticoagulation or antiplatelets for 72 hours.   - Head of bed elevation to 30-45 degrees.   - BID IV PPI while inpatient.   - Continue Cipro/Flagyl IV while in the hospital until esophagram tomorrow.   - Tylenol PRN for pain; will escalate prn  - Esophagram in the morning to rule out a leak. If no leak, can discharge home.   - CBC and BMP in AM  - Liquid diet for three days starting tomorrow. Soft diet on day 4, 5,6. Regular diet starting next Friday as tolerated     Hypothyroidism  - PTA levothyroxine    PACs  Sinus bradycardia  Well perfused. EKG confirms PACs and sinus arrhythmia. No intervention if asx and well-perfused.        Diet: NPO for Medical/Clinical Reasons Except for: Ice Chips  DVT Prophylaxis: Pneumatic Compression Devices  Stinson Catheter: Not present  Fluids: LR  Lines: None     Cardiac Monitoring: None  Code Status: Full Code    Clinically Significant Risk Factors Present on Admission                                Disposition Plan      Expected Discharge Date: 06/27/2023                The patient's care was discussed with the Attending Physician, Dr. Aaron.      Hayley Severson, MD-MPH, PGY-2  Medicine Service, TONIE TEAM 2  [facility]  [place]  Securely message with igobubble (more info)  Text page via [facility] Paging/Directory   See signed in provider for up to date coverage information  ______________________________________________________________________    Chief Complaint   Post-operative Zenker's diverticulum management    History is obtained from the patient    History of Present Illness   Manuel Romero is a 87 year old female admitted on 6/26/2023. She has a history of Zenker's diverticulum and hypothyroidism and is admitted for post-operative management s/p Zenker's repair.    Patient tolerated the procedure well. No pain, nausea, abdominal pain, nausea, vomiting, diarrhea, headache, fever, chest pain, shortness of breath, cough, or other concerns. Patient stable post-operatively. Plan explained to patient and daughters. No questions or concerns.       Past Medical History    Past Medical History:   Diagnosis Date     Zenker's diverticulum        Past Surgical History   Past Surgical History:   Procedure Laterality Date     GYN SURGERY         Prior to Admission Medications   Prior to Admission Medications   Prescriptions Last Dose Informant Patient Reported? Taking?   Cranberry 400 MG CAPS 6/24/2023  Yes Yes   Sig: Take 1 capsule by mouth daily   Multiple Vitamins-Minerals (PRESERVISION AREDS) TABS 6/24/2023  Yes Yes   Sig: Take 1 tablet by mouth daily   levothyroxine (SYNTHROID/LEVOTHROID) 75 MCG tablet 6/24/2023  Yes Yes   Sig: Take 75 mcg by mouth daily   melatonin 5 MG tablet   Yes Yes   Sig: Take 5 mg by mouth nightly as needed   omeprazole (PRILOSEC) 20 MG DR capsule 6/24/2023  Yes Yes   Sig: TAKE 2 CAPSULES BY MOUTH ONCE DAILY BEFORE A MEAL      Facility-Administered Medications: None        Review of Systems    The 10 point Review of Systems is negative other than noted in the HPI or here.     Social History   I have reviewed this patient's social history and updated it with pertinent information if needed.  Social History     Tobacco  Use     Smoking status: Never     Smokeless tobacco: Never   Vaping Use     Vaping Use: Never used   Substance Use Topics     Alcohol use: Never     Drug use: Never       Family History   Did not obtain.     Allergies   Allergies   Allergen Reactions     Penicillins Rash     Sulfa Antibiotics      Codeine Nausea and Unknown     Morphine Nausea and Other (See Comments)        Physical Exam   Vital Signs: Temp: 97.8  F (36.6  C) Temp src: Oral BP: (!) 147/65 Pulse: 55   Resp: 19 SpO2: 92 % O2 Device: Nasal cannula Oxygen Delivery: 2 LPM  Weight: 117 lbs 15.14 oz    General Appearance: Alert, awake, well-appearing, in no acute distress  Respiratory: CTAB, no increased work of breathing, ETCO2 in place  Cardiovascular: Additional beats intermittently, no murmur noted  GI: Soft, non-tender, non-distended  Skin: No rashes or other lesions noted  Neuro: No focal deficits     Medical Decision Making     Please see A&P for additional details of medical decision making.      Data   ------------------------- PAST 24 HR DATA REVIEWED -----------------------------------------------   No

## 2023-06-26 NOTE — ANESTHESIA CARE TRANSFER NOTE
Patient: Manuel Romero    Procedure: Procedure(s):  ESOPHAGOGASTRODUODENOSCOPY (EGD)  with Zenker's diverticulotomy  INSERTION, NASOGASTRIC TUBE Latex Free       Diagnosis: Zenker's diverticulum [K22.5]  Diagnosis Additional Information: No value filed.    Anesthesia Type:   General     Note:    Oropharynx: oropharynx clear of all foreign objects and spontaneously breathing  Level of Consciousness: drowsy  Oxygen Supplementation: face mask  Level of Supplemental Oxygen (L/min / FiO2): 6  Independent Airway: airway patency satisfactory and stable  Dentition: dentition unchanged  Vital Signs Stable: post-procedure vital signs reviewed and stable  Report to RN Given: handoff report given  Patient transferred to: PACU    Handoff Report: Identifed the Patient, Identified the Reponsible Provider, Reviewed the pertinent medical history, Discussed the surgical course, Reviewed Intra-OP anesthesia mangement and issues during anesthesia, Set expectations for post-procedure period and Allowed opportunity for questions and acknowledgement of understanding      Vitals:  Vitals Value Taken Time   BP     Temp     Pulse 56 06/26/23 0847   Resp     SpO2 95 % 06/26/23 0847   Vitals shown include unvalidated device data.    Electronically Signed By: ELY Blum CRNA  June 26, 2023  8:47 AM

## 2023-06-26 NOTE — ANESTHESIA POSTPROCEDURE EVALUATION
Patient: Manuel Romero    Procedure: Procedure(s):  ESOPHAGOGASTRODUODENOSCOPY (EGD)  with Zenker's diverticulotomy  INSERTION, NASOGASTRIC TUBE Latex Free       Anesthesia Type:  General    Note:  Disposition: Outpatient   Postop Pain Control: Uneventful            Sign Out: Well controlled pain   PONV: No   Neuro/Psych: Uneventful            Sign Out: Acceptable/Baseline neuro status   Airway/Respiratory: Uneventful            Sign Out: Acceptable/Baseline resp. status   CV/Hemodynamics: Uneventful            Sign Out: Acceptable CV status; No obvious hypovolemia; No obvious fluid overload   Other NRE: NONE   DID A NON-ROUTINE EVENT OCCUR? No           Last vitals:  Vitals Value Taken Time   /77 06/26/23 0848   Temp     Pulse 61 06/26/23 0851   Resp     SpO2 97 % 06/26/23 0851   Vitals shown include unvalidated device data.    Electronically Signed By: Mane Alvarez MD  June 26, 2023  8:53 AM

## 2023-06-26 NOTE — ANESTHESIA PREPROCEDURE EVALUATION
Anesthesia Pre-Procedure Evaluation    Patient: Manuel Romero   MRN: [id number] : 1935        Procedure : Procedure(s):  ESOPHAGOGASTRODUODENOSCOPY (EGD)  with Zenker's diverticulotomy  INSERTION, NASOGASTRIC TUBE Latex Free          History reviewed. No pertinent past medical history.   Past Surgical History:   Procedure Laterality Date     GYN SURGERY        Allergies   Allergen Reactions     Penicillins Rash     Sulfa Antibiotics      Codeine Nausea and Unknown     Morphine Nausea and Other (See Comments)      Social History     Tobacco Use     Smoking status: Unknown     Smokeless tobacco: Not on file   Substance Use Topics     Alcohol use: Never      Wt Readings from Last 1 Encounters:   23 53.4 kg (117 lb 11.6 oz)        Anesthesia Evaluation   Pt has had prior anesthetic. Type: General.        ROS/MED HX  ENT/Pulmonary:       Neurologic:       Cardiovascular:       METS/Exercise Tolerance:     Hematologic:       Musculoskeletal:       GI/Hepatic:       Renal/Genitourinary:       Endo:     (+) thyroid problem,     Psychiatric/Substance Use:       Infectious Disease:       Malignancy:       Other:            Physical Exam    Airway        Mallampati: II    Neck ROM: limited     Respiratory Devices and Support         Dental       (+) Modest Abnormalities - crowns, retainers, 1 or 2 missing teeth      Cardiovascular   cardiovascular exam normal          Pulmonary   pulmonary exam normal                OUTSIDE LABS:  CBC: No results found for: WBC, HGB, HCT, PLT  BMP: No results found for: NA, POTASSIUM, CHLORIDE, CO2, BUN, CR, GLC  COAGS: No results found for: PTT, INR, FIBR  POC: No results found for: BGM, HCG, HCGS  HEPATIC: No results found for: ALBUMIN, PROTTOTAL, ALT, AST, GGT, ALKPHOS, BILITOTAL, BILIDIRECT, ARCHANA  OTHER: No results found for: PH, LACT, A1C, BAYLEE, PHOS, MAG, LIPASE, AMYLASE, TSH, T4, T3, CRP, SED    Anesthesia Plan    ASA Status:  3   NPO Status:  NPO Appropriate    Anesthesia  Type: General.     - Airway: ETT   Induction: Intravenous.   Maintenance: Inhalation.        Consents    Anesthesia Plan(s) and associated risks, benefits, and realistic alternatives discussed. Questions answered and patient/representative(s) expressed understanding.     - Discussed: Risks, Benefits and Alternatives for BOTH SEDATION and the PROCEDURE were discussed     - Discussed with:  Patient    Use of blood products discussed: Yes.     - Discussed with: Patient.     Postoperative Care    Pain management: IV analgesics.   PONV prophylaxis: Ondansetron (or other 5HT-3)     Comments:                Mane Alvarez MD

## 2023-06-26 NOTE — ANESTHESIA PROCEDURE NOTES
Airway       Patient location during procedure: OR       Procedure Start/Stop Times: 6/26/2023 7:42 AM  Staff -        CRNA: Corinna Garrison APRN CRNA       Performed By: CRNA  Consent for Airway        Urgency: elective  Indications and Patient Condition       Indications for airway management: saulo-procedural       Induction type:intravenous       Mask difficulty assessment: 1 - vent by mask    Final Airway Details       Final airway type: endotracheal airway       Successful airway: ETT - single  Endotracheal Airway Details        ETT size (mm): 6.5       Cuffed: yes       Successful intubation technique: direct laryngoscopy       DL Blade Type: Bass 2       Grade View of Cords: 1       Adjucts: stylet       Position: Right       Measured from: lips       Secured at (cm): 22       Bite Block used: Endo bite block.    Post intubation assessment        Placement verified by: capnometry and chest rise        Number of attempts at approach: 1       Secured with: pink tape       Ease of procedure: easy       Dentition: Intact and Unchanged    Medication(s) Administered   Medication Administration Time: 6/26/2023 7:42 AM

## 2023-06-26 NOTE — PROVIDER NOTIFICATION
Room 234 LILO  Patient has cardiac monitoring orders.  She does have an  irregular heart rate.  Please confirm this is needed.  We don't have cardiac monitoring on this floor.  Thanks  [phone]

## 2023-06-26 NOTE — PLAN OF CARE
"Goal Outcome Evaluation:      Plan of Care Reviewed With: patient  /43 (BP Location: Left arm)   Pulse 61   Temp 97.7  F (36.5  C) (Oral)   Resp 13   Ht 1.575 m (5' 2\")   Wt 53.5 kg (117 lb 15.1 oz)   SpO2 94%   BMI 21.57 kg/m       Patient arrived to  around 1030.  Alert and oriented x 4. Patient states pain is tolerable with Tylenol.  Abdomen soft with bowel sounds present, patient has not had BM this shift.  Voiding adequate amounts of urine. NG intact and clamped. Continue with POC.                  "

## 2023-06-27 ENCOUNTER — APPOINTMENT (OUTPATIENT)
Dept: GENERAL RADIOLOGY | Facility: CLINIC | Age: 88
DRG: 391 | End: 2023-06-27
Attending: INTERNAL MEDICINE
Payer: COMMERCIAL

## 2023-06-27 LAB
ALBUMIN SERPL BCG-MCNC: 3.4 G/DL (ref 3.5–5.2)
ALP SERPL-CCNC: 68 U/L (ref 35–104)
ALT SERPL W P-5'-P-CCNC: 6 U/L (ref 0–50)
ANION GAP SERPL CALCULATED.3IONS-SCNC: 10 MMOL/L (ref 7–15)
AST SERPL W P-5'-P-CCNC: 16 U/L (ref 0–45)
ATRIAL RATE - MUSE: 51 BPM
BILIRUB SERPL-MCNC: 1.2 MG/DL
BUN SERPL-MCNC: 9.2 MG/DL (ref 8–23)
CALCIUM SERPL-MCNC: 8.6 MG/DL (ref 8.8–10.2)
CHLORIDE SERPL-SCNC: 106 MMOL/L (ref 98–107)
CREAT SERPL-MCNC: 0.74 MG/DL (ref 0.51–0.95)
DEPRECATED HCO3 PLAS-SCNC: 24 MMOL/L (ref 22–29)
DIASTOLIC BLOOD PRESSURE - MUSE: NORMAL MMHG
ERYTHROCYTE [DISTWIDTH] IN BLOOD BY AUTOMATED COUNT: 12.9 % (ref 10–15)
GFR SERPL CREATININE-BSD FRML MDRD: 78 ML/MIN/1.73M2
GLUCOSE BLDC GLUCOMTR-MCNC: 91 MG/DL (ref 70–99)
GLUCOSE SERPL-MCNC: 108 MG/DL (ref 70–99)
HCT VFR BLD AUTO: 37.4 % (ref 35–47)
HGB BLD-MCNC: 12.3 G/DL (ref 11.7–15.7)
INTERPRETATION ECG - MUSE: NORMAL
MAGNESIUM SERPL-MCNC: 1.8 MG/DL (ref 1.7–2.3)
MCH RBC QN AUTO: 33.2 PG (ref 26.5–33)
MCHC RBC AUTO-ENTMCNC: 32.9 G/DL (ref 31.5–36.5)
MCV RBC AUTO: 101 FL (ref 78–100)
P AXIS - MUSE: 33 DEGREES
PHOSPHATE SERPL-MCNC: 3.4 MG/DL (ref 2.5–4.5)
PLATELET # BLD AUTO: 153 10E3/UL (ref 150–450)
POTASSIUM SERPL-SCNC: 3.7 MMOL/L (ref 3.4–5.3)
PR INTERVAL - MUSE: 140 MS
PROT SERPL-MCNC: 6.1 G/DL (ref 6.4–8.3)
QRS DURATION - MUSE: 76 MS
QT - MUSE: 448 MS
QTC - MUSE: 412 MS
R AXIS - MUSE: -11 DEGREES
RBC # BLD AUTO: 3.7 10E6/UL (ref 3.8–5.2)
SODIUM SERPL-SCNC: 140 MMOL/L (ref 136–145)
SYSTOLIC BLOOD PRESSURE - MUSE: NORMAL MMHG
T AXIS - MUSE: 19 DEGREES
VENTRICULAR RATE- MUSE: 51 BPM
WBC # BLD AUTO: 8.9 10E3/UL (ref 4–11)

## 2023-06-27 PROCEDURE — 250N000011 HC RX IP 250 OP 636: Mod: JZ | Performed by: STUDENT IN AN ORGANIZED HEALTH CARE EDUCATION/TRAINING PROGRAM

## 2023-06-27 PROCEDURE — 85027 COMPLETE CBC AUTOMATED: CPT | Performed by: STUDENT IN AN ORGANIZED HEALTH CARE EDUCATION/TRAINING PROGRAM

## 2023-06-27 PROCEDURE — 84100 ASSAY OF PHOSPHORUS: CPT

## 2023-06-27 PROCEDURE — 99232 SBSQ HOSP IP/OBS MODERATE 35: CPT | Mod: GC | Performed by: STUDENT IN AN ORGANIZED HEALTH CARE EDUCATION/TRAINING PROGRAM

## 2023-06-27 PROCEDURE — 250N000013 HC RX MED GY IP 250 OP 250 PS 637: Performed by: STUDENT IN AN ORGANIZED HEALTH CARE EDUCATION/TRAINING PROGRAM

## 2023-06-27 PROCEDURE — 83735 ASSAY OF MAGNESIUM: CPT | Performed by: STUDENT IN AN ORGANIZED HEALTH CARE EDUCATION/TRAINING PROGRAM

## 2023-06-27 PROCEDURE — 999N000128 HC STATISTIC PERIPHERAL IV START W/O US GUIDANCE

## 2023-06-27 PROCEDURE — C9113 INJ PANTOPRAZOLE SODIUM, VIA: HCPCS | Mod: JZ | Performed by: STUDENT IN AN ORGANIZED HEALTH CARE EDUCATION/TRAINING PROGRAM

## 2023-06-27 PROCEDURE — 258N000003 HC RX IP 258 OP 636: Performed by: STUDENT IN AN ORGANIZED HEALTH CARE EDUCATION/TRAINING PROGRAM

## 2023-06-27 PROCEDURE — 82962 GLUCOSE BLOOD TEST: CPT

## 2023-06-27 PROCEDURE — 36415 COLL VENOUS BLD VENIPUNCTURE: CPT | Performed by: STUDENT IN AN ORGANIZED HEALTH CARE EDUCATION/TRAINING PROGRAM

## 2023-06-27 PROCEDURE — 120N000002 HC R&B MED SURG/OB UMMC

## 2023-06-27 PROCEDURE — 74220 X-RAY XM ESOPHAGUS 1CNTRST: CPT

## 2023-06-27 PROCEDURE — 80053 COMPREHEN METABOLIC PANEL: CPT | Performed by: STUDENT IN AN ORGANIZED HEALTH CARE EDUCATION/TRAINING PROGRAM

## 2023-06-27 PROCEDURE — 74220 X-RAY XM ESOPHAGUS 1CNTRST: CPT | Mod: 26 | Performed by: RADIOLOGY

## 2023-06-27 PROCEDURE — 99222 1ST HOSP IP/OBS MODERATE 55: CPT | Performed by: DIETITIAN, REGISTERED

## 2023-06-27 RX ORDER — DEXTROSE MONOHYDRATE 100 MG/ML
INJECTION, SOLUTION INTRAVENOUS CONTINUOUS PRN
Status: DISCONTINUED | OUTPATIENT
Start: 2023-06-27 | End: 2023-06-29 | Stop reason: HOSPADM

## 2023-06-27 RX ORDER — LIDOCAINE 4 G/G
1 PATCH TOPICAL
Status: DISCONTINUED | OUTPATIENT
Start: 2023-06-27 | End: 2023-06-29 | Stop reason: HOSPADM

## 2023-06-27 RX ADMIN — LEVOTHYROXINE SODIUM 75 MCG: 75 TABLET ORAL at 08:09

## 2023-06-27 RX ADMIN — CIPROFLOXACIN 400 MG: 2 INJECTION, SOLUTION INTRAVENOUS at 20:55

## 2023-06-27 RX ADMIN — SODIUM CHLORIDE, POTASSIUM CHLORIDE, SODIUM LACTATE AND CALCIUM CHLORIDE 1000 ML: 600; 310; 30; 20 INJECTION, SOLUTION INTRAVENOUS at 09:40

## 2023-06-27 RX ADMIN — PANTOPRAZOLE SODIUM 40 MG: 40 INJECTION, POWDER, FOR SOLUTION INTRAVENOUS at 08:08

## 2023-06-27 RX ADMIN — METRONIDAZOLE 500 MG: 500 INJECTION, SOLUTION INTRAVENOUS at 07:04

## 2023-06-27 RX ADMIN — LIDOCAINE PATCH 4% 1 PATCH: 40 PATCH TOPICAL at 09:38

## 2023-06-27 RX ADMIN — METRONIDAZOLE 500 MG: 500 INJECTION, SOLUTION INTRAVENOUS at 19:23

## 2023-06-27 RX ADMIN — SODIUM CHLORIDE, POTASSIUM CHLORIDE, SODIUM LACTATE AND CALCIUM CHLORIDE 1000 ML: 600; 310; 30; 20 INJECTION, SOLUTION INTRAVENOUS at 16:53

## 2023-06-27 RX ADMIN — CIPROFLOXACIN 400 MG: 2 INJECTION, SOLUTION INTRAVENOUS at 08:12

## 2023-06-27 RX ADMIN — PANTOPRAZOLE SODIUM 40 MG: 40 INJECTION, POWDER, FOR SOLUTION INTRAVENOUS at 16:53

## 2023-06-27 ASSESSMENT — ACTIVITIES OF DAILY LIVING (ADL)
ADLS_ACUITY_SCORE: 25
ADLS_ACUITY_SCORE: 28
ADLS_ACUITY_SCORE: 25
ADLS_ACUITY_SCORE: 28
ADLS_ACUITY_SCORE: 25
ADLS_ACUITY_SCORE: 28
ADLS_ACUITY_SCORE: 25
ADLS_ACUITY_SCORE: 28
ADLS_ACUITY_SCORE: 25
ADLS_ACUITY_SCORE: 28

## 2023-06-27 NOTE — PHARMACY-CONSULT NOTE
Pharmacy Tube Feeding Consult    Medication reviewed for administration by feeding tube and for potential food/drug interactions.    Recommendation: Recommend holding tube feedings for 1 hours before and 1 hours after administration of levothyroxine..     Pharmacy will continue to follow as new medications are ordered.

## 2023-06-27 NOTE — PLAN OF CARE
Goal Outcome Evaluation:      Plan of Care Reviewed With: patient, family    Overall Patient Progress: improvingOverall Patient Progress: improving    Outcome Evaluation: see RD progress note

## 2023-06-27 NOTE — PLAN OF CARE
Problem: Plan of Care - These are the overarching goals to be used throughout the patient stay.    Goal: Absence of Hospital-Acquired Illness or Injury  Outcome: Progressing  Intervention: Identify and Manage Fall Risk  Recent Flowsheet Documentation  Taken 6/27/2023 0000 by Rachel Diana RN  Safety Promotion/Fall Prevention: activity supervised     B/P: 98/52, T: 98.4, P: 64, R: 14 No C/O pain. AOx2, needs reorienting. Using call light appropriately. Up assist of 1 to bathroom. Appeared to sleep in between cares. Continue to monitor and notify MD with any significant changes.

## 2023-06-27 NOTE — CONSULTS
Gastroenterology Consultation      Date of Admission:  6/26/2023  Reason for Admission: Zenker's diverticulum  Date of Consult  6/27/2023   Requesting Physician:  Nadja Christy MD           ASSESSMENT AND RECOMMENDATIONS:   Assessment:  87 year old female with a history of hypothyroidism, hiatal hernia, GERD and Zenker's diverticulum.  Admitted 6/26 for monitoring s/p Zenker's diverticulotomy.    #Zenker's diverticulum - s/p diverticulotomy (6/26/2023)  #Hx GERD  Patient initially presented with sx of copious phlegm at HS with halitosis and found to have Zenker's diverticulum on VSS and esophagram as well as radiographic e/o aspiration into RLL.  Now s/p Zenker's diverticulotomy on 6/26.  Post procedure soft pressures with NPO status and no IVF/nor TF infusing via NGT.  Remains on 2 L N.C. overnight for sats 92% but afebrile/no leukocytosis and without s/sx of resp distress.  Awaiting XR esophagram to r/o leak post procedure.  Note expect pneumomediastinum and air within the pharyngeal spaces.    Recommendations:  -- IVF bolus now, MIVF while remains NPO.  -- Follow for XR Esophagram completion/read. If no leak and otherwise clinically doing well, can discharge home. Pneumomediastinum and air within the pharyngeal spaces expected.   -NPO until esophagram completed. If without e/o leak begin the following diet progression:   -Begin FL diet x3 days (6/27-6/30) then,   -Soft diet x3 days (7/1-7/3) then,   -Regular diet starting 7/4  -NGT for meds/nutrition as needed.     -If able to advance diet, remove NGT.   -If unable to resume diet would consult RD to begin TF.  -No anticoagulation or antiplatelets for 72 hours.  -IV PPI BID while inpatient.  Resume PTA Omeprazold 20 mg BID (per pt this is what she takes) upon discharge.  -Continue IV Cipro/Flagyl while remains inpatient.  Can discontinue at discharge.  -Analgesia and antiemetics per primary team.    Discussed with primary medicine team.    Gastroenterology follow  up recommendations: (we will arrange)  Follow up with Dr. Edouard in 1 month.    Thank you for involving us in this patient's care. Please do not hesitate to contact the GI service with any questions or concerns.     Pt seen and care plan discussed with Dr. Edouard, GI staff physician.    Overall time spent on the date of this encounter preparing to see the patient (including chart review of available notes, clinical status events, imaging and labs); obtaining and/or reviewing separately obtained history; ordering medications, tests or procedures; communicating with other health care professionals; and documenting the above clinical information in the electronic medical record was 60 minutes.    Lali Joshua PA-C  GI Service  [facility]  Text Page  -------------------------------------------------------------------------------------------------------------------       Reason for Consultation:   Post Zenker's diverticulotomy         History of Present Illness:   Manuel Romero is a 87 year old female with a history of hypothyroidism, hiatal hernia, GERD and Zenker's diverticulum.  Admitted 6/26 for monitoring s/p Zenker's diverticulotomy.    Patient seen and examined at 09:00. History is obtained from patient and dtrs.  Reports was having increased pooling of secretions/phlegm in throat (hector worse at HS) as well as worsening halitosis that brought her into her PCP who ordered VSS/esophagram which revealed Zenker's diverticulum.  Additionally reports hx of GERD and was taking omeprazole 20 mg daily but was having increased sx of reflux (thought d/t large hiatal hernia) and recently increased to BID dosing to better control sx.  Denies any changes to appetite, dysphagia, N/V, unintentional weight losses PTA.      Currently reports some phlegm in throat but feels significantly decreased and managing own secretions well, swallowing without pain.  Denies fever, chills, CP, SOB, dysphagia,  N/V.       Procedures:  6/26/2023: EGD under GA with Dr. Edouard  Indications:  For therapy of Zenker's diverticulum                                                                          Findings:        A pediatric gastroscope was advanced from the left nare to the second        part of the duodenum. A ~2 cm Zenker's diverticulum was seen in the        hypopharynx. The remainder of the upper gastrointestinal tract as below.        A 12 Fr nasogastric tube was advanced over the guide wire into the        stomach to facilitate orientation of the esophageal lumen. Placement was        confirmed by scope visualization and the tube was secured to the nose        with a nasal bridal.        Adult gastroscope was then advanced back to the Zenker's        diverticulum/cricopharyngeal bar. The mucosal layer was first incised        using a standard SB knife (Endocut 3-1-1) to expose the cricopharyngeal        bar, which was then incised. Once it was felt that we reached the base        of the cricopharyngeus muscle/diverticulum dissection was stopped. The        base of the dissection was then closed with 3 hemoclips. No bleeding was        noted at the end of the procedure.        The examined duodenum was normal.        A large hiatal hernia was present.        NG slightly dislodged during extubation and had to be retracted to        remove coil in the mouth. NG tube now at 45 cm at the left nare.     Impression:      - Normal examined duodenum.                          - Large hiatal hernia.                          - Zenker's diverticulum with prominent cricopharyngeal                          bar. NG tube placed for orientation and subsequent                          feeding. Cricopharyngeal myotomy/septotomy                          successfully performed as described above.                          - NG tube slightly dislodged at the end upon                          extubation and repositioned at the bedside.                           - No specimens collected.                          - MODIFER 22 given complexity of procedure requiring                          advanced techniques             Past Medical History:   Reviewed and edited as appropriate  Past Medical History:   Diagnosis Date     Zenker's diverticulum             Past Surgical History:   Reviewed and edited as appropriate   Past Surgical History:   Procedure Laterality Date     ESOPHAGOSCOPY, GASTROSCOPY, DUODENOSCOPY (EGD), COMBINED N/A 6/26/2023    Procedure: ESOPHAGOGASTRODUODENOSCOPY (EGD)  with Zenker's diverticulotomy;  Surgeon: Patrick Edouard MD;  Location: [facility] OR     GYN SURGERY       INSERT TUBE NASOGASTRIC N/A 6/26/2023    Procedure: INSERTION, NASOGASTRIC TUBE Latex Free;  Surgeon: Patrick Edouard MD;  Location:  OR            Social History:   .  Lives in [place], [place].  2 daughters.   Alcohol: Denies  Tobacco: Denies  Illicit drugs: Denies         Family History:   Patient's family history is reviewed today and is non-contributory    History reviewed. No pertinent family history.          Allergies:   Reviewed and edited as appropriate     Allergies   Allergen Reactions     Penicillins Rash     Sulfa Antibiotics      Codeine Nausea and Unknown     Morphine Nausea and Other (See Comments)          Medications:     Current Facility-Administered Medications   Medication     acetaminophen (TYLENOL) solution 650 mg    Or     acetaminophen (TYLENOL) Suppository 650 mg     ciprofloxacin (CIPRO) infusion 400 mg     levothyroxine (SYNTHROID/LEVOTHROID) tablet 75 mcg     lidocaine (LMX4) cream     lidocaine 1 % 0.1-1 mL     melatonin tablet 1 mg     metroNIDAZOLE (FLAGYL) infusion 500 mg     naloxone (NARCAN) injection 0.2 mg     naloxone (NARCAN) injection 0.2 mg     naloxone (NARCAN) injection 0.4 mg     naloxone (NARCAN) injection 0.4 mg     ondansetron (ZOFRAN ODT) ODT tab 4 mg    Or     ondansetron (ZOFRAN) injection 4 mg     pantoprazole (PROTONIX)  IV push injection 40 mg     prochlorperazine (COMPAZINE) injection 5 mg    Or     prochlorperazine (COMPAZINE) tablet 5 mg     sodium chloride (PF) 0.9% PF flush 3 mL     sodium chloride (PF) 0.9% PF flush 3 mL             Review of Systems:     A complete review of systems was performed and is negative except as noted in the HPI           Physical Exam:   Temp: 98.4  F (36.9  C) Temp src: Oral BP: 98/52 Pulse: 64   Resp: 14 SpO2: 92 % O2 Device: Nasal cannula Oxygen Delivery: 2 LPM  Wt:   Wt Readings from Last 2 Encounters:   06/26/23 53.5 kg (117 lb 15.1 oz)   06/07/23 53 kg (116 lb 14.4 oz)      General: Pleasant elderly female in NAD.  Answers appropriately.    HEENT: Head is AT/NC. Sclera anicteric. No conjunctival injection.  Oropharynx is clear, moist and w/o exudate or lesions. No thrush. Good dentition.  NGT (12 Fr) in nare/bridled in place.  Lungs: Non-labored breathing on 2 L N.C. satting 95%. No crepitus on palpation.  Heart: Regular rate and rhythm.   Abdomen: Soft, non-tender, non-distended.  No rebound or peritoneal signs.  Extremities: WWP, no pedal edema.  MSK: no gross deformity, no muscle wasting  Skin: No jaundice or rash  Neurologic: Grossly non-focal.  CN 2-12 grossly intact.   Psych: mood appropriate to situation           Data:   Labs and imaging below were independently reviewed and interpreted    LAB WORK:    BMP  Recent Labs   Lab 06/27/23  0610 06/26/23  0701    140   POTASSIUM 3.7 3.9   CHLORIDE 106 107   BAYLEE 8.6* 9.0   CO2 24 21*   BUN 9.2 15.8   CR 0.74 0.64   * 113*     CBC  Recent Labs   Lab 06/27/23  0610 06/26/23  0701   WBC 8.9 10.0   RBC 3.70* 4.03   HGB 12.3 13.3   HCT 37.4 39.9   * 99   MCH 33.2* 33.0   MCHC 32.9 33.3   RDW 12.9 12.9    161     INRNo lab results found in last 7 days.  LFTs  Recent Labs   Lab 06/27/23  0610   ALKPHOS 68   AST 16   ALT 6   BILITOTAL 1.2   PROTTOTAL 6.1*   ALBUMIN 3.4*      PANCNo lab results found in last 7  days.    IMAGING:  (personally reviewed)    6/27/2023: XR Esophagram - PENDING    Results for orders placed or performed during the hospital encounter of 06/26/23   XR Abdomen Port 1 View    Impression    IMPRESSION: Enteric tube tip projects over the expected location of  the proximal stomach.    I have personally reviewed the examination and initial interpretation  and I agree with the findings.    JESSICA DICKERSON MD         SYSTEM ID:  [id number]     3/23/2023: XR ESOPHAGRAM DOUBLE CONTRAST  IMPRESSION:   1.  There is at least a 2.5  x 3 cm Zenker's diverticulum.   2.  Retained barium in this refluxes retrograde into the pharynx and based on volume in the diverticulum, this is subsequently aspirated into her right lower lobe which is minimally symptomatic with a slight cough.  3.  Large hiatal hernia with an intrathoracic stomach.  4.  Poor esophageal clearance.  5.  Findings discussed by the undersigned with Dr. Edouard at 1300.  6.  Speech pathology provided her recommendations regarding her eating to minimize aspiration.           =======================================================================

## 2023-06-27 NOTE — PLAN OF CARE
"Goal Outcome Evaluation:      Plan of Care Reviewed With: patient  /54 (BP Location: Right arm)   Pulse 60   Temp 98.2  F (36.8  C) (Oral)   Resp 16   Ht 1.575 m (5' 2\")   Wt 53.5 kg (117 lb 15.1 oz)   SpO2 92%   BMI 21.57 kg/m       Patient denies pain.  Abdomen soft with bowel sounds present, patient reports no BM this shift.  NG intact and clamped. Voiding adequate amounts of urine. Up with assist of one and gait belt. Family at bedside. Continue with POC.                  "

## 2023-06-27 NOTE — DISCHARGE INSTRUCTIONS
"Gastroenterology Discharge Instructions:  -After your procedure, advance you diet as follows:   -Begin liquid diet x3 days (6/27-6/30) then,   -Soft diet x3 days (7/1-7/3) then,   -Regular diet starting 7/4    -Recommend follow up for with Dr. Edouard in clinic in 1 month  -Outpatient GI scheduling team  / GI RN Care Coordinator should reach out to you to arrange. If you do not receive a call to schedule your recommended procedures/clinic follow up, please call [facility] phone: [phone], option 1 for clinic scheduling.  -If you develop any of the following, please contact your GI RNCC (Renetta Tejeda) at [phone] or if after hours call [phone] to speak with a triage nurse:  Fevers over 101, +/- chills.  Significant nausea/vomiting causing inability to take in fluids depleting hydration.  Severe pain, ongoing symptoms (pain, nausea) that cannot be controlled with prescribed or over the counter medications.  Signs of dehydration (pale skin, low blood pressure, lightheadedness, dark urine, \"sticky\" skin when pinched, rapid heart rate, little to no urine output).     Primary Medical Team  - Please follow-up with Speech and Physical Therapy outpatient.  - We also noticed a murmur when listening to your heart. You should follow-up with your primary doctor and consider an echocardiogram if desired/thought to be helpful by your primary doctor.   "

## 2023-06-27 NOTE — UTILIZATION REVIEW
"Admission Status; Secondary Review Determination    Under the authority of the Utilization Management Committee, the utilization review process indicated a secondary review on the above patient. The review outcome is based on review of the medical records, discussions with staff, and applying clinical experience noted on the date of the review.    (x) Outpatient Status with extended recovery is appropriate - This patient does not meet hospital inpatient criteria. If this patient's primary payer is Medicare and was admitted as an inpatient, Condition Code 44 should be used and patient status changed to outpatient recovery.    RATIONALE FOR DETERMINATION:      Per GI provider note:  \"Patient initially presented with sx of copious phlegm at HS with halitosis and found to have Zenker's diverticulum on VSS and esophagram as well as radiographic e/o aspiration into RLL.  Now s/p Zenker's diverticulotomy on 6/26.  Post procedure soft pressures with NPO status and no IVF/nor TF infusing via NGT.  Remains on 2 L N.C. overnight for sats 92% but afebrile/no leukocytosis and without s/sx of resp distress.  Awaiting XR esophagram to r/o leak post procedure.  Note expect pneumomediastinum and air within the pharyngeal spaces.\"    VS notable for SBP near 100 yesterday evening; normal today.  Remains on supplemental oxygen at 2 liters    Imaging unremarkable.  Await results of esophogram to rule out leak    Per chart notes it is anticipated the patient will discharge if esophogram shows no leak    The procedure codes as outpatient.  Recommend to change to outpatient status.  If the patient develops any post-procedure complications or found to have leak on esophogram requiring further inpatient care would recommend reassessment for inpatient status.      Dr. Severson notified of this recommendation via text message through [facility] today    Patient was admitted to the hospital after the procedure. Patient has Medicare and the procedure " is not on the CMS inpatient list. No documented complications or unexpected recovery. Patient can be safely monitored for bleeding and recover in outpatient/extended recovery setting.  The severity of illness, intensity of service provided, expected LOS and risk for adverse outcome doesn't meet inpatient hospital admission.    The information on this document is developed by the utilization review team in order for the business office to ensure compliance. This only denotes the appropriateness of proper admission status and does not reflect the quality of care rendered.  The definitions of Inpatient Status and Observation Status used in making the determination above are those provided in the CMS Coverage Manual, Chapter 1 and Chapter 6, section 70.4.    Sincerely,    Sheldon Chaudhary MD  Utilization Review  Physician Advisor  [facility]

## 2023-06-27 NOTE — PLAN OF CARE
Goal Outcome Evaluation:      Plan of Care Reviewed With: patient    Overall Patient Progress: no changeOverall Patient Progress: no change    Cardiac: denies cardiac chest pain   Resp: 2 L NC, sating >93%, denies SOB  Neuro: A&Ox4, forgetful, calm & cooperative   GI/: voiding spontaneously, passing gas, audible BS, no BM this shift   Diet: NPO except ice chips   Skin/Incisions/Drains: NG tube in place, no other skin deficits noted   IV access: L PIV SL   Labs: Reviewed   Nausea: denies   Activity: SBA  Pain: pain managed w/ positioning & PRN tylenol     Plan: continue POC  New changes this shift: no acute changes this shift

## 2023-06-27 NOTE — PROGRESS NOTES
[facility]    Progress Note - Medicine Service, MAROON TEAM 2       Date of Admission:  6/26/2023    Assessment & Plan   Manuel Romero is a 87 year old female admitted on 6/26/2023. She has a history of Zenker's diverticulum and hypothyroidism and is admitted for post-operative management s/p Zenker's repair.    Today:  - NG tube placement --> ok to use for meds and will start nutrition with tube feedings based on Nutrition recs.   - Esophagram with aspiration --> SLP consult     Zenker's diverticulum s/p cricopharyngeal myotomy/septotomy  Large hiatal hernia  Patient s/p Zenker's diverticulotomy with GI 6/26 AM. Tolerated well. Admitted for observation overnight and evaluation for leak on POD1. Unfortunately, had rudi aspiration on esophagram and c/f for possible leak.   - NG tube placement --> ok to use for meds and will start nutrition with tube feedings based on Nutrition recs.   - Esophagram with aspiration --> SLP consult  - Ice chips for comfort. Otherwise NPO.   - No anticoagulation or antiplatelets for 72 hours.   - Head of bed elevation to 30-45 degrees.   - BID IV PPI while inpatient.   - Continue Cipro/Flagyl IV while in the hospital  - Tylenol PRN for pain; will escalate prn  - CBC and BMP in AM    Acute hypoxic respiratory failure  Low oxygen requirement after surgery (1-2L NC).  - Continuous pulse ox  - Oxygen therapy for SpO2 goal >92%     Hypothyroidism  - PTA levothyroxine     PACs  Sinus bradycardia  Well perfused. EKG confirms PACs and sinus arrhythmia. No intervention if asx and well-perfused.        Diet: NPO for Medical/Clinical Reasons Except for: Ice Chips    DVT Prophylaxis: Pneumatic Compression Devices  Stinson Catheter: Not present  Fluids: LR  Lines: None     Cardiac Monitoring: None  Code Status: Full Code      Clinically Significant Risk Factors Present on Admission              # Hypoalbuminemia: Lowest albumin = 3.4 g/dL at 6/27/2023   6:10 AM, will monitor as appropriate                   Disposition Plan      Expected Discharge Date: 06/27/2023        Discharge Comments: Pending esophagram 6/27 or 6/28        The patient's care was discussed with the Attending Physician, Dr. Christy.    Hayley Severson, MD  Medicine Service, [address]  Securely message with [employer] (more info)  Text page via [employer] Paging/Directory   See signed in provider for up to date coverage information  ______________________________________________________________________    Interval History   Nursing notes reviewed. NAEO. Minimal oxygen requirement. Back pain. No chest pain or shortness of breath. Still coughing up some phlegm. No abdominal pain, nausea or vomiting.     Physical Exam   Vital Signs: Temp: 98.4  F (36.9  C) Temp src: Oral BP: 98/52 Pulse: 64   Resp: 14 SpO2: 92 % O2 Device: Nasal cannula Oxygen Delivery: 2 LPM  Weight: 117 lbs 15.14 oz    General Appearance:  Alert, awake, well-appearing, in no acute distress  Respiratory: CTAB, no increased work of breathing, ETCO2/NC in place  Cardiovascular: Additional beats intermittently, no murmur noted  GI: Soft, non-tender, non-distended  Skin: No rashes or other lesions noted  Neuro: No focal deficits     Medical Decision Making     Please see A&P for additional details of medical decision making.      Data   ------------------------- PAST 24 HR DATA REVIEWED -----------------------------------------------

## 2023-06-27 NOTE — DISCHARGE SUMMARY
"[facility]  Discharge Summary - Medicine & Pediatrics       Date of Admission:  6/26/2023  Date of Discharge:  6/29/2023  Discharging Provider: Dr. Nadja Christy  Discharge Service: Medicine Service, TONIE TEAM 2    Discharge Diagnoses   Zenker's diverticulum - s/p diverticulotomy (6/26/2023)  GERD  Large hiatal hernia  Dysphagia  Aspiration  Acute hypoxemic respiratory failure  Hypothyroidism  PACs with sinus bradycardia, asymptomatic    Clinically Significant Risk Factors          Follow-ups Needed After Discharge       Discharge Disposition   Discharged to home  Condition at discharge: Stable    Hospital Course   Manuel Romero is a 87 year old female admitted on 6/26/2023. She has a history of Zenker's diverticulum and hypothyroidism and is admitted for post-operative management s/p Zenker's repair \"diverticulotomy.\"     Zenker's diverticulum s/p cricopharyngeal myotomy/septotomy  Large hiatal hernia  Dysphagia  Aspiration  Acute hypoxemic respiratory failure, resolved  Patient s/p Zenker's diverticulum procedure with GI 6/26 AM. Tolerated well. Admitted for observation overnight and evaluation for leak on POD1. Esophagram completed 6/27 and showed no leak (vs less likely contained leak). However, patient did have aspiration of the contrast on esophagram. SLP was consulted and noted aspiration on VFSS as well. However, thought to be minimal. Risks of pneumonia and pneumonitis discussed with patient and daughters. NG tube removed on day of discharge 6/29 with start of full liquid diet per GI recommendations. Patient without pain or discomfort, felt ready to discharge home. S/P IV ciprofloxacin and Flagyl while inpatient. Received IVF while NPO. Needed 1-2L NC overnight post-operatively, but was then weaned off on POD1.   - BID IV PPI while inpatient --> will transition to Omeprazole 20 mg BID at discharge  - Tylenol PRN for pain; lidocaine patches  - Liquid diet for 3 " days, then soft for 3 days, then regular diet after that.    Systolic murmur RUSB, likely AoS  - Consider TTE outpatient     Hypothyroidism  - PTA levothyroxine     PACs  Sinus bradycardia  EKG confirms PACs and sinus arrhythmia. No intervention as asx and well-perfused.     Consultations This Hospital Stay   NUTRITION SERVICES ADULT IP CONSULT  GI PANCREATICOBILIARY ADULT IP CONSULT  PHYSICAL THERAPY ADULT IP CONSULT  NUTRITION SERVICES ADULT IP CONSULT  SPEECH LANGUAGE PATH ADULT IP CONSULT  PHARMACY IP CONSULT  NURSING TO CONSULT FOR VASCULAR ACCESS CARE IP CONSULT  NURSING TO CONSULT FOR VASCULAR ACCESS CARE IP CONSULT  OCCUPATIONAL THERAPY ADULT IP CONSULT    Code Status   Full Code       The patient was discussed with Dr. Christy.     Hayley Severson, MD-MPH, PGY-2  Medicine Service  [facility] ED [address] [address]  Phone: [phone]  ______________________________________________________________________    Physical Exam   Vital Signs: Temp: 97.6  F (36.4  C) Temp src: Oral BP: (!) 142/77 Pulse: 70   Resp: 16 SpO2: 95 % O2 Device: None (Room air) Oxygen Delivery: 2 LPM  Weight: 117 lbs 15.14 oz    General Appearance:  Alert, awake, well-appearing, in no acute distress  Respiratory: CTAB, no increased work of breathing  Cardiovascular: Additional beats intermittently, systolic murmur noted RUSB  GI: Soft, non-tender, non-distended  Skin: No rashes or other lesions noted  Neuro: No focal deficits       Primary Care Physician   Carmina Corona    Discharge Orders      Speech Therapy Referral      Physical Therapy Referral      Reason for your hospital stay    You were in the hospital for observation after your Zenker's procedure. You tolerated it well, have no leak on your esophagram, and are safe to discharge home with GI follow-up. Of note, you do have a small amount of aspiration, so you should follow-up with Speech outpatient and monitor for signs of pneumonia.  Please follow GI recommendations for diet over the next week.     Activity    Your activity upon discharge: activity as tolerated     Adult [facility] Follow-up and recommended labs and tests    Follow up with primary care provider, Carmina Corona, within 7 days to evaluate after surgery.  The following labs/tests are recommended: CBC, CMP.      Appointments on [place] and/or [facility] (with [facility] or [facility] provider or service). Call [phone] if you haven't heard regarding these appointments within 7 days of discharge.     Diet    Follow this diet upon discharge:   - Full liquid diet for 3 days (6/27-6/30) then,  - Soft diet x3 days (7/1-7/3) then,  - Regular diet starting 7/4 as tolerated       Significant Results and Procedures   See EMR.     Discharge Medications   Current Discharge Medication List      CONTINUE these medications which have CHANGED    Details   omeprazole (PRILOSEC) 20 MG DR capsule Take 1 capsule (20 mg) by mouth 2 times daily  Qty: 60 capsule, Refills: 0    Associated Diagnoses: Zenker diverticulum         CONTINUE these medications which have NOT CHANGED    Details   Cranberry 400 MG CAPS Take 1 capsule by mouth daily      levothyroxine (SYNTHROID/LEVOTHROID) 75 MCG tablet Take 75 mcg by mouth daily      melatonin 5 MG tablet Take 5 mg by mouth nightly as needed      Multiple Vitamins-Minerals (PRESERVISION AREDS) TABS Take 1 tablet by mouth daily           Allergies   Allergies   Allergen Reactions     Penicillins Rash     Sulfa Antibiotics      Codeine Nausea and Unknown     Morphine Nausea and Other (See Comments)

## 2023-06-27 NOTE — PROGRESS NOTES
"CLINICAL NUTRITION SERVICES - ASSESSMENT NOTE     Nutrition Prescription    RECOMMENDATIONS FOR MDs/PROVIDERS TO ORDER:  -- Further adjustments to free water flushes per provider discretion; currently ordered patency free water flushes (60 mL q4h) only as pt getting IV fluid bolus today  -- Diet per provider/SLP recs    Malnutrition status:   Patient does not meet two of the established criteria necessary for diagnosing malnutrition but is at risk for malnutrition    Recommendations already ordered by Registered Dietitian (RD):  Start TF via NG:  -- Initiate Jevity 1.5 Azam @ 10 mL/hr and advance by 10 mL q8h as tolerated to goal rate 40 mL/hr  -- Do not advance tube feeding rate unless K+ is = or > 3.0, Mg++ is = or > 1.5, and Phos is = or > 1.9. Possible refeeding risk; pt's family thinks pt eating ~50% baseline intake x 2 weeks, so will monitor lytes as a precaution  -- 60 mL q4h free water flushes (additional 360 mL free water daily, for FT patency)   -- Phos add-on today. BMP, Mg++, Phos daily while advance to goal  -- Elevate HOB with gastric feeds per protocol    GOAL: Jevity 1.5 Azam (or equivalent) @ goal of 40ml/hr (960ml/day) provides: 1440 kcals (27 kcal/kg), 61 g PRO (1.15 g/kg), 729 ml free H20, 207 g CHO, and 20 g fiber daily.     Future/Additional Recommendations:  -- Monitor TF tolerance, advancement; wt trends  -- Monitor diet adv plan      REASON FOR ASSESSMENT  Manuel Romero is a/an 87 year old female assessed by the dietitian for 6/26 Provider Order - \"tube feed recommendations\"    6/26- Spoke with primary team yesterday 6/26, at that time, team only planned to start TF if esophogram shows a leak/other PO intake inability. Per chart, esophogram is happening today.    6/27 (today) received provider order consult: Registered Dietitian to order TF per Medical Nutrition Therapy Guidelines.  Per primary team, plan to start TF today but may not need to discharge on TF pending SLP " "recommendations    NUTRITION HISTORY  Met with pt and pt's daughters at bedside. Patient reports eating regular diet PTA, denies any known food allergies/intolerances.  Pt was having issues of phlegm build up with eating recently, but denied issues of choking on food or difficulty with eating.  Pt has been eating slightly less than her usual the past 2 weeks; pt's daughter estimated maybe 50% of her usual intake, and said pt has been eating cereal for many meals.  Pt and daughters confirmed pt's weight has been stable recently.  Pt is disappointed her diet is not advancing today, but understands the rationale.    Per chart, pt is s/p Zenker's diverticulotomy on 6/26.  Per GI provider note today, \"Denies any changes to appetite, dysphagia, N/V, unintentional weight losses PTA.\" Pt with 12 Fr NG tube (placed by GI yesterday during procedure). Tube is bridled.     CURRENT NUTRITION ORDERS  Diet: NPO  Intake/Tolerance: NPO x 1 day since admit    LABS  Labs reviewed  - K+ and Mg++ WNL    MEDICATIONS  Medications reviewed  - 1 L LR bolus    ANTHROPOMETRICS  Height: 157.5 cm (5' 2\")  Most Recent Weight: 53.5 kg (117 lb 15.1 oz)    IBW: 50 kg   BMI: Normal BMI  Weight History: stable PTA, even trending up the past 5 months. Pt and pt's daughters confirm this  Wt Readings from Last 10 Encounters:   06/26/23 53.5 kg (117 lb 15.1 oz)   06/14/23 52.6 kg (116 lb 0 oz) per Care Everywhere   06/07/23 53 kg (116 lb 14.4 oz)   03/02/23 51.7 kg (114 lb) per Care Everywhere   01/12/23 52 kg (114 lb 9.6 oz) per Care Everywhere      Dosing Weight: 53 kg (actual)    ASSESSED NUTRITION NEEDS  Estimated Energy Needs: 1234-6880 kcals/day (25 - 30 kcals/kg)  Justification: Maintenance  Estimated Protein Needs: 53-64 grams protein/day (1 - 1.2 grams of pro/kg)  Justification: Hypercatabolism with acute illness and Post-op  Estimated Fluid Needs: 1265-3029 mL/day (1 mL/kcal)   Justification: Maintenance, or other per provider pending fluid " status    PHYSICAL FINDINGS  See malnutrition section below.    MALNUTRITION  % Intake: Decreased intake does not meet criteria  % Weight Loss: None noted  Subcutaneous Fat Loss: Facial region, Upper arm, and Lower arm: mild  Muscle Loss: Temporal, Upper arm (bicep, tricep), and Lower arm  (forearm): mild- may be sarcopenia  Fluid Accumulation/Edema: None noted  Malnutrition Diagnosis: Patient does not meet two of the established criteria necessary for diagnosing malnutrition but is at risk for malnutrition    NUTRITION DIAGNOSIS  Inadequate oral intake related to NPO for surgery yesterday and continues NPO today due to aspiration noted on esophogram as evidenced by NPO since admit yesterday     INTERVENTIONS  Implementation  Nutrition Education: Provided education on starting TF with pt/family; discussed ongoing need for TF pending diet adv/provider discretion   Enteral Nutrition and Feeding tube flush - see above  Collaboration with other providers -  Per primary team, plan to start TF today as esophogram showed aspiration, but pt may not need to discharge on TF pending SLP recommendations    Goals  Once TF reaches goal, total avg nutritional intake to meet a minimum of 25 kcal/kg and 1 g PRO/kg daily (per dosing wt 53 kg).     Monitoring/Evaluation  Progress toward goals will be monitored and evaluated per protocol.     Poppy Corley RD, , LD  Weekday Pager: [phone]  Weekday Units covered: 7C (all beds) and 7B (beds 7230 through 7240)  Weekend/Holiday RD Pager: [phone]

## 2023-06-28 ENCOUNTER — APPOINTMENT (OUTPATIENT)
Dept: GENERAL RADIOLOGY | Facility: CLINIC | Age: 88
DRG: 391 | End: 2023-06-28
Attending: INTERNAL MEDICINE
Payer: COMMERCIAL

## 2023-06-28 ENCOUNTER — APPOINTMENT (OUTPATIENT)
Dept: PHYSICAL THERAPY | Facility: CLINIC | Age: 88
DRG: 391 | End: 2023-06-28
Attending: INTERNAL MEDICINE
Payer: COMMERCIAL

## 2023-06-28 ENCOUNTER — APPOINTMENT (OUTPATIENT)
Dept: SPEECH THERAPY | Facility: CLINIC | Age: 88
DRG: 391 | End: 2023-06-28
Attending: INTERNAL MEDICINE
Payer: COMMERCIAL

## 2023-06-28 PROBLEM — K22.5 ZENKER DIVERTICULUM: Status: ACTIVE | Noted: 2023-06-28

## 2023-06-28 LAB
ANION GAP SERPL CALCULATED.3IONS-SCNC: 11 MMOL/L (ref 7–15)
BUN SERPL-MCNC: 8.9 MG/DL (ref 8–23)
CALCIUM SERPL-MCNC: 8.7 MG/DL (ref 8.8–10.2)
CHLORIDE SERPL-SCNC: 106 MMOL/L (ref 98–107)
CREAT SERPL-MCNC: 0.57 MG/DL (ref 0.51–0.95)
DEPRECATED HCO3 PLAS-SCNC: 23 MMOL/L (ref 22–29)
ERYTHROCYTE [DISTWIDTH] IN BLOOD BY AUTOMATED COUNT: 12.8 % (ref 10–15)
GFR SERPL CREATININE-BSD FRML MDRD: 88 ML/MIN/1.73M2
GLUCOSE SERPL-MCNC: 111 MG/DL (ref 70–99)
HCT VFR BLD AUTO: 38.5 % (ref 35–47)
HGB BLD-MCNC: 12.7 G/DL (ref 11.7–15.7)
MAGNESIUM SERPL-MCNC: 1.9 MG/DL (ref 1.7–2.3)
MCH RBC QN AUTO: 32.9 PG (ref 26.5–33)
MCHC RBC AUTO-ENTMCNC: 33 G/DL (ref 31.5–36.5)
MCV RBC AUTO: 100 FL (ref 78–100)
PHOSPHATE SERPL-MCNC: 2.6 MG/DL (ref 2.5–4.5)
PLATELET # BLD AUTO: 161 10E3/UL (ref 150–450)
POTASSIUM SERPL-SCNC: 3.5 MMOL/L (ref 3.4–5.3)
RADIOLOGIST FLAGS: ABNORMAL
RBC # BLD AUTO: 3.86 10E6/UL (ref 3.8–5.2)
SODIUM SERPL-SCNC: 140 MMOL/L (ref 136–145)
WBC # BLD AUTO: 6.6 10E3/UL (ref 4–11)

## 2023-06-28 PROCEDURE — 74230 X-RAY XM SWLNG FUNCJ C+: CPT | Mod: 26 | Performed by: RADIOLOGY

## 2023-06-28 PROCEDURE — 120N000002 HC R&B MED SURG/OB UMMC

## 2023-06-28 PROCEDURE — 250N000013 HC RX MED GY IP 250 OP 250 PS 637: Performed by: STUDENT IN AN ORGANIZED HEALTH CARE EDUCATION/TRAINING PROGRAM

## 2023-06-28 PROCEDURE — 99232 SBSQ HOSP IP/OBS MODERATE 35: CPT | Mod: GC | Performed by: STUDENT IN AN ORGANIZED HEALTH CARE EDUCATION/TRAINING PROGRAM

## 2023-06-28 PROCEDURE — 97161 PT EVAL LOW COMPLEX 20 MIN: CPT | Mod: GP

## 2023-06-28 PROCEDURE — 92611 MOTION FLUOROSCOPY/SWALLOW: CPT | Mod: GN

## 2023-06-28 PROCEDURE — 92526 ORAL FUNCTION THERAPY: CPT | Mod: GN

## 2023-06-28 PROCEDURE — 99233 SBSQ HOSP IP/OBS HIGH 50: CPT | Performed by: DIETITIAN, REGISTERED

## 2023-06-28 PROCEDURE — 80048 BASIC METABOLIC PNL TOTAL CA: CPT | Performed by: STUDENT IN AN ORGANIZED HEALTH CARE EDUCATION/TRAINING PROGRAM

## 2023-06-28 PROCEDURE — 85027 COMPLETE CBC AUTOMATED: CPT | Performed by: STUDENT IN AN ORGANIZED HEALTH CARE EDUCATION/TRAINING PROGRAM

## 2023-06-28 PROCEDURE — 84100 ASSAY OF PHOSPHORUS: CPT

## 2023-06-28 PROCEDURE — 36415 COLL VENOUS BLD VENIPUNCTURE: CPT | Performed by: STUDENT IN AN ORGANIZED HEALTH CARE EDUCATION/TRAINING PROGRAM

## 2023-06-28 PROCEDURE — 97530 THERAPEUTIC ACTIVITIES: CPT | Mod: GP

## 2023-06-28 PROCEDURE — 250N000011 HC RX IP 250 OP 636: Mod: JZ | Performed by: STUDENT IN AN ORGANIZED HEALTH CARE EDUCATION/TRAINING PROGRAM

## 2023-06-28 PROCEDURE — 97116 GAIT TRAINING THERAPY: CPT | Mod: GP

## 2023-06-28 PROCEDURE — C9113 INJ PANTOPRAZOLE SODIUM, VIA: HCPCS | Performed by: STUDENT IN AN ORGANIZED HEALTH CARE EDUCATION/TRAINING PROGRAM

## 2023-06-28 PROCEDURE — 999N000128 HC STATISTIC PERIPHERAL IV START W/O US GUIDANCE

## 2023-06-28 PROCEDURE — 92610 EVALUATE SWALLOWING FUNCTION: CPT | Mod: GN

## 2023-06-28 PROCEDURE — 83735 ASSAY OF MAGNESIUM: CPT | Performed by: STUDENT IN AN ORGANIZED HEALTH CARE EDUCATION/TRAINING PROGRAM

## 2023-06-28 PROCEDURE — 74230 X-RAY XM SWLNG FUNCJ C+: CPT

## 2023-06-28 RX ADMIN — LIDOCAINE PATCH 4% 1 PATCH: 40 PATCH TOPICAL at 08:36

## 2023-06-28 RX ADMIN — PANTOPRAZOLE SODIUM 40 MG: 40 INJECTION, POWDER, FOR SOLUTION INTRAVENOUS at 08:36

## 2023-06-28 RX ADMIN — LEVOTHYROXINE SODIUM 75 MCG: 75 TABLET ORAL at 08:36

## 2023-06-28 RX ADMIN — METRONIDAZOLE 500 MG: 500 INJECTION, SOLUTION INTRAVENOUS at 07:31

## 2023-06-28 RX ADMIN — CIPROFLOXACIN 400 MG: 2 INJECTION, SOLUTION INTRAVENOUS at 08:37

## 2023-06-28 RX ADMIN — PANTOPRAZOLE SODIUM 40 MG: 40 INJECTION, POWDER, FOR SOLUTION INTRAVENOUS at 17:09

## 2023-06-28 RX ADMIN — Medication 1 MG: at 23:54

## 2023-06-28 ASSESSMENT — ACTIVITIES OF DAILY LIVING (ADL)
ADLS_ACUITY_SCORE: 28
ADLS_ACUITY_SCORE: 27
ADLS_ACUITY_SCORE: 28
ADLS_ACUITY_SCORE: 27
ADLS_ACUITY_SCORE: 28
ADLS_ACUITY_SCORE: 27
ADLS_ACUITY_SCORE: 27
ADLS_ACUITY_SCORE: 28
ADLS_ACUITY_SCORE: 27
ADLS_ACUITY_SCORE: 28

## 2023-06-28 NOTE — PROGRESS NOTES
CLINICAL NUTRITION SERVICES - BRIEF NOTE     Nutrition Prescription    RECOMMENDATIONS FOR MDs/PROVIDERS TO ORDER:  Further adjustments to free water flushes per provider discretion; currently ordered patency free water flushes (60 mL q4h)     Recommendations already ordered by Registered Dietitian (RD):  Continue TF advancement as ordered, but update goal rate to 45 mL/hr to account for TF being held 1 hr before and 1 hr after levothyroxine dose @ 0800 daily    Goal TF: Jevity 1.5 @ 45 mL/hr x 22 hrs (9am-7am) to provide 990 mL formula, 1483 kcal, 63 g protein, 751 mL free water, 213 g CHO, and ~21 g protein       *See RD note on 6/27 for nutrition assessment    Diet: NPO    Nutrition Support: TF started last night @ 10 mL/hr with recs to adv by 10 mL q8h and pending lytes (K+ is = or > 3.0, Mg++ is = or > 1.5, and Phos is = or > 1.9) to goal 40 mL/hr    Intake: TF reached 30 mL/hr this morning, goal is 40 mL/hr.  K+, Mg++, Phos WNL today.  On track to adv to goal rate tonight. Per pharmacist, recommended to hold TF 1 hr before and 1 hr after levothyroxine dose if expected to be on TF > 7 days.  Per SLP note today, pt recommended to remain NPO or now and plan for VFSS tomorrow.  Since at this time it is unclear on if/when diet will advance, will update TF scheduled to accommodate for holding TF for levothyroxine.        INTERVENTIONS  See above    Monitoring/Evaluation  Progress toward goals will be monitored and evaluated per protocol.        Poppy Corley RD, , LD  Weekday Pager: [phone]  Weekday Units covered: 7C (all beds) and 7B (beds 7230 through 7240)  Weekend/Holiday RD Pager: [phone]

## 2023-06-28 NOTE — PROGRESS NOTES
Brief GI note  Called by speech therapist, Edith Choe regarding unable to fully evaluate the patient due to termination of esophagogram due to aspiration. From our perspective, there is no leakage as the presumed leak from the esophagogram on 6/27/23 was in the pouch which was normally seen post Zenker's procedure.    Discussed with speech, given do not think patient has leaking, speech will plan to do bedside swallowing again tomorrow.    Discussed with Dr. Edouard.  Thien Harris MD  Gastroenterology/Hepatology Fellow

## 2023-06-28 NOTE — PLAN OF CARE
Goal Outcome Evaluation:  Care from 4 pm to 7 pm  Pt is alert; but pleasantly confused. Pt repeatedly touches NG stating that it is not comfortable.Pt is easily redirectable; but very forgetful, bed alarm and chair alarm is maintained for safety. Provider is requested for bed side attendant as there is a high Risk  that patient might pull her NG tube out. Bed side attendant order is in place.  Pt had XR video swallow study this afternoon, result is pending. TF is running at 40 ml/hr via NG, TF needs to be increased to a goal rate of 45 at 1 am. TF runs 22 hrs/day( pls see TF order). Up to bathroom with assist x1.  Daughter was here today; daughter stated that both daughters will be here in the morning.  Continue with POC

## 2023-06-28 NOTE — PROGRESS NOTES
"   06/28/23 0437   Appointment Info   Signing Clinician's Name / Credentials (SLP) Edith Choe MA CCC-SLP   General Information   Onset of Illness/Injury or Date of Surgery 06/26/23   Referring Physician Severson, Hayley, MD   Patient/Family Therapy Goal Statement (SLP) Pt wants to go home   Pertinent History of Current Problem Pt is a 87 year old female with a history of hypothyroidism, hiatal hernia, GERD. Pt noted to have increased pooling of secretions/phlegm in throat (hector worse at HS) as well as worsening halitosis that brought her into her PCP who ordered VSS/esophagram which revealed Zenker's diverticulum. Admitted 6/26 for monitoring s/p Zenker's diverticulotomy. Pt had an esophagram on 6/27 which revealed \"rudi aspiration of water-soluble contrast.  Small focal outpouching along the anterior aspect of surgical  diverticulectomy site is suspicious for a contained leak. No overt  free leak demonstrated.\"     Abbreviated VFSS completed today with thin water soluble contrast. Unfortunately, radiology did not feel confident that there is not a leak present given their read of esophagram yesterday and therefore trials of thick and pudding barium was deferred at this time. Note that radiology is recommending a repeat esophagram to r/o leak.     General Observations Alert, seems confused   Type of Evaluation   Type of Evaluation Swallow Evaluation   General Swallowing Observations   Past History of Dysphagia Pt seen for brief VFSS in March 2023 which revealed \"no oral dysphagia. Swallow is timely and consistent with patients age. Hyolaryngeal excursion is reduced, however epiglottic inversion is complete and airway closure is achieved during the swallow. There is a large Zenkers diverticulum position to the left of the esophagus. Diverticulum fills with barium and shows repeated kickback of material into the pharynx. With continued intake the diverticulum does not clear and material  is regurgitated into " "the pharynx where there is laryngeal penetration and repeated aspiration. This can be exacerbated with coughing and hiatal hernia which pushes residual material upward. Patient is at risk for persistant aspiration of food and liquid, food impaction in the Zenker's diverticulum, and aspiration related illness. See radiologist report for additional information and esophagram results. Zenker's diverticulotomy may improve bolus transition, reduce risk of aspiration, and improve nutritional intake.\"   Respiratory Support (General Swallowing Observations) nasal cannula   Current Diet/Method of Nutritional Intake (General Swallowing Observations, NIS) NPO;nasogastric tube (NG)   Swallowing Evaluation Videofluoroscopic swallow study (VFSS)   VFSS Evaluation   Radiologist Resident   Views Taken left lateral   Physical Location of Procedure [facility]   VFSS Textures Trialed thin liquids - unfortunately, as radiology is still concerned for a leak, barium contrast not indicated (and therefore, unable to trial thick liquids or solids   VFSS Eval: Thin Liquid Texture Trial   Mode of Presentation, Thin Liquid spoon;cup;fed by clinician   Order of Presentation 1 (spoon), 2, 3   Preparatory Phase WFL   Oral Phase, Thin Liquid WFL   Bolus Location When Swallow Triggered pyriforms   Pharyngeal Phase, Thin Liquid   (impaired)   Rosenbek's Penetration Aspiration Scale: Thin Liquid Trial Results 7 - contrast passes glottis, visible subglottic residue remains despite patient's response (aspiration)   Response to Aspiration unproductive reflexive cough   Diagnostic Statement Pt is now demonstrating a delayed swallow trigger and reduced laryngeal vestibule closure (neither present on previous VFSS from March), resulting in aspiration during and after the swallow. Pt's response to aspiration is a weak, nonproductive cough. Suspect thicker liquids or solids may be better tolerated given delay in swallow, however unable to assess " these on VFSS today d/t radiologist persisting concern for leak. Site of surgery was difficult to view on today's exam.   Esophageal Phase of Swallow   Patient reports or presents with symptoms of esophageal dysphagia Yes   Esophageal comments Hx of Zenker's now s/p diverticulotomy   Swallowing Recommendations   Diet Consistency Recommendations NPO;free water protocol   Medication Administration Recommendations, Swallowing (SLP) via NJ at this time   General Therapy Interventions   Planned Therapy Interventions Dysphagia Treatment   Dysphagia treatment Oropharyngeal exercise training;Modified diet education;Instruction of safe swallow strategies;Compensatory strategies for swallowing   Clinical Impression   Criteria for Skilled Therapeutic Interventions Met (SLP Eval) Yes, treatment indicated   SLP Diagnosis New pharyngeal dysphagia with aspiration of thin liquids during and after the swallow   Risks & Benefits of therapy have been explained evaluation/treatment results reviewed;care plan/treatment goals reviewed;risks/benefits reviewed;current/potential barriers reviewed;participants included;patient   Clinical Impression Comments   Limited videoswallow study completed per MD order to objectively assess oropharyngeal swallow mechanism; swallow evaluated with water soluble contrast only (thin liquid) d/t radiology c/f leak. Under fluoroscopy, pt presents with mild oropharyngeal dysphagia. Oral phase WFL. Swallow trigger delayed; this is a change from previous study in March. Once triggered, BOT retraction and pharyngeal stripping wave are adequate. Epiglottic inversion partial (c/w previous study). Reduced laryngeal vestibule closure (a change from previous study). Deep penetration and aspiration occurs during and after the swallow with thin liquids, despite attempting chin tuck strategy. Pt demonstrates a weak reflexive cough to aspiration, which does not clear aspirated material. At this time, recommend the pt  remain NPO with MERISSA SHRESTHA for sips/chips to promote ongoing attempts at swallowing. Discussed with GI attending. Will plan to complete bedside evaluation 6/29 to determine if diet modification (thick liquids) will improve swallowing safety; GI aware of the limitations of bedside swallow evaluation (unable to r/o silent aspiration or determine if pharyngeal residue is present with solids).      SLP Total Evaluation Time   Evaluation, videofluoroscopic eval of swallow function Minutes (40850) 20   SLP Discharge Recommendation home with outpatient speech therapy   SLP Rationale for DC Rec New pharyngeal dysphagia   SLP Brief overview of current status  Unfortunately, pt continues to aspirate with thin liquids. Unable to trial thickened liquids or solids on today's evaluation, as barium contrast is not indicated when a leak into the tissues is suspected (radiologist expressing persisting c/f leak). It is important to note that swallow function today is worse than swallow function observed on previous VFSS in March. Unclear why this may be, as primary deficits today included a delay in swallow trigger and reduced laryngeal vestibule closure (potentially 2/2 weakness?). Additionally, when pt does aspirate, her cough is not strong enough to clear all aspirated material. At this time, recommend the pt remain NPO with MERISSA SHRESTHA for sips/chips to promote ongoing attempts at swallowing.    Total Session Time   Total Session Time (sum of timed and untimed services) 35

## 2023-06-28 NOTE — PLAN OF CARE
"Goal Outcome Evaluation:      Plan of Care Reviewed With: patient  BP (!) 146/65 (BP Location: Right arm)   Pulse 59   Temp 98  F (36.7  C) (Oral)   Resp 18   Ht 1.575 m (5' 2\")   Wt 53.5 kg (117 lb 15.1 oz)   SpO2 94%   BMI 21.57 kg/m        Patient denies pain. Abdomen soft with bowel sounds present, patient reports she is passing gas but no BM this shift.  A&O x 3 disoriented to time.  NG intact with tube feeds infusing at 30 ml/hr. Up with assist of one.  Voiding adequate amounts of urine.  Daughter at bedside.  Continue with POC.                  "

## 2023-06-28 NOTE — PLAN OF CARE
Time: 2300-0730  Temp: 98.9  F (37.2  C) Temp src: Oral BP: 131/59 Pulse: 71   Resp: 18 SpO2: 94 % O2 Device: Nasal cannula Oxygen Delivery: 1 LPM     Activity: Assist of 1.  Diet: NPO except ice chips. Meds through NG.   Pain: Denies.   Neuro: Disoriented to time. CMS intact.    Cardiac: WDL.   Respiratory: Lung sounds clear. Denies SOB. Nasal cannula 1-2 LPM nasal cannula.   Skin: No new deficits noted.   GI/: Voiding spontaneously. No BM this shift.   Lines/inf: PIV. LR 1L bolus over 10 hours.

## 2023-06-28 NOTE — UTILIZATION REVIEW
Admission Status; Secondary Review Determination         Under the authority of the Utilization Management Committee, the utilization review process indicated a secondary review on the above patient.  The review outcome is based on review of the medical records, discussions with staff, and applying clinical experience noted on the date of the review.        (x)      Inpatient Status Appropriate - This patient's medical care is consistent with medical management for inpatient care and reasonable inpatient medical practice.       RATIONALE FOR DETERMINATION   The patient is a 87-year-old female admitted on 6/26/2023.  The patient was admitted for monitoring status post Zenker's diverticulotomy.  There was concern for dysphagia and aspiration.  Postprocedure the patient was kept on 2 L of nasal cannula overnight and only was able to maintain 92% oxygen saturation.  Esophagram on 6/27 noted suspicion for a contained leak and possible persistent aspiration.  Speech therapy did a formal evaluation and found persistent aspiration and recommends n.p.o. and NJ tube for possible medication need.  Further evaluation and treatment will be required given ongoing risk for aspiration post Zenker's diverticulum repair.  Based on current status, inpatient status is appropriate and Dr. Powers will be notified via [employer] paging text with this recommendation.  Utilization review yesterday did recommend outpatient status which is no longer appropriate given above information.      The severity of illness, intensity of service provided, expected LOS and risk for adverse outcome make the care complex, high risk and appropriate for hospital admission.        The information on this document is developed by the utilization review team in order for the business office to ensure compliance.  This only denotes the appropriateness of proper admission status and does not reflect the quality of care rendered.         The definitions of  Inpatient Status and Observation Status used in making the determination above are those provided in the CMS Coverage Manual, Chapter 1 and Chapter 6, section 70.4.      Sincerely,     Tal Bryant MD  Physician Advisor  Utilization Review/ Case Management  [facility].

## 2023-06-28 NOTE — PROGRESS NOTES
[id number]    Pt A & O X 4.  AVSS. O2 sats 93-94% on 1 L per NC. Up with SBA to BR. Voiding jason clear urine. Pt still having large amounts of clear sputum that she is coughing up.  NG with TF started at 10 ml. Started at 1700, 60 cc water flushes Q 4 hours. NPO with ice chips. If no N/V and pt tolerates TF rate then in 8 hours Tf can be advanced up to 20 ml.  Bolus LR X 1000 ml at 100 ml running. Denies pain, nausea. Makes needs known. Will continue to monitor.

## 2023-06-28 NOTE — PROGRESS NOTES
"   06/28/23 4760   Appointment Info   Signing Clinician's Name / Credentials (SLP) Edith Choe MA CCC-SLP   General Information   Onset of Illness/Injury or Date of Surgery 06/26/23   Referring Physician Severson, Hayley, MD   Patient/Family Therapy Goal Statement (SLP) Pt wants to go home   Pertinent History of Current Problem Pt is a 87 year old female with a history of hypothyroidism, hiatal hernia, GERD. Pt noted to have increased pooling of secretions/phlegm in throat (hector worse at HS) as well as worsening halitosis that brought her into her PCP who ordered VSS/esophagram which revealed Zenker's diverticulum. Admitted 6/26 for monitoring s/p Zenker's diverticulotomy. Pt had an esophagram on 6/27 which revealed \"rudi aspiration of water-soluble contrast.  Small focal outpouching along the anterior aspect of surgical  diverticulectomy site is suspicious for a contained leak. No overt  free leak demonstrated.\" Clinical swallow eval completed today per MD order. Note that GI did not feel that the contrast observed on esophagram represented a leak and pt OK to try up to full liquids (see diet progression in GI note).   General Observations Alert, upright at EOB   Type of Evaluation   Type of Evaluation Swallow Evaluation   Oral Motor   Oral Musculature generally intact   Structural Abnormalities none present   Dentition (Oral Motor)   Dentition (Oral Motor) dental appliance/dentures   Dental Appliance/Denture (Oral Motor) upper   Facial Symmetry (Oral Motor)   Facial Symmetry (Oral Motor) WNL   Lip Function (Oral Motor)   Lip Range of Motion (Oral Motor) WNL   Tongue Function (Oral Motor)   Tongue ROM (Oral Motor) WNL   Jaw Function (Oral Motor)   Jaw Function (Oral Motor) WNL   Cough/Swallow/Gag Reflex (Oral Motor)   Comment, Cough/Swallow/Gag Reflex (Oral Motor) WNL   Vocal Quality/Secretion Management (Oral Motor)   Vocal Quality (Oral Motor) WNL   Comment, Vocal Quality/Secretion Management (Oral Motor) " "Pt's family reports pt did not have any dysphonia or change in vocal quality after the procedure.   General Swallowing Observations   Past History of Dysphagia Pt seen for brief VFSS in March 2023 which revealed \"no oral dysphagia. Swallow is timely and consistent with patients age. Hyolaryngeal excursion is reduced, however epiglottic inversion is complete and airway closure is achieved during the swallow. There is a large Zenkers diverticulum position to the left of the esophagus. Diverticulum fills with barium and shows repeated kickback of material into the pharynx. With continued intake the diverticulum does not clear and material  is regurgitated into the pharynx where there is laryngeal penetration and repeated aspiration. This can be exacerbated with coughing and hiatal hernia which pushes residual material upward. Patient is at risk for persistant aspiration of food and liquid, food impaction in the Zenker's diverticulum, and aspiration related illness. See radiologist report for additional information and esophagram results. Zenker's diverticulotomy may improve bolus transition, reduce risk of aspiration, and improve nutritional intake.\"   Respiratory Support (General Swallowing Observations) none   Current Diet/Method of Nutritional Intake (General Swallowing Observations, NIS) NPO;nasogastric tube (NG)   Swallowing Evaluation Clinical swallow evaluation   Clinical Swallow Evaluation   Feeding Assistance no assistance needed   Clinical Swallow Evaluation Textures Trialed thin liquids;pureed   Clinical Swallow Eval: Thin Liquid Texture Trial   Mode of Presentation, Thin Liquids cup;straw;self-fed   Volume of Liquid or Food Presented 4oz thin water   Oral Phase of Swallow WFL   Pharyngeal Phase of Swallow coughing/choking;throat clearing;repeated swallows   Diagnostic Statement s/sx of aspiration   Clinical Swallow Evaluation: Puree Solid Texture Trial   Mode of Presentation, Puree spoon;self-fed   Volume " of Puree Presented 3oz applesauce   Oral Phase, Puree WFL   Pharyngeal Phase, Puree repeated swallows   Diagnostic Statement No overt s/sx of aspiration   Esophageal Phase of Swallow   Patient reports or presents with symptoms of esophageal dysphagia Yes   Esophageal comments Hx of Zenker's now s/p diverticulotomy   Swallowing Recommendations   Diet Consistency Recommendations NPO;free water protocol   Medication Administration Recommendations, Swallowing (SLP) via NJ   Instrumental Assessment Recommendations VFSS (videofluoroscopic swallowing study)   General Therapy Interventions   Planned Therapy Interventions Dysphagia Treatment   Dysphagia treatment Instruction of safe swallow strategies;Compensatory strategies for swallowing  (VFSS)   Clinical Impression   Criteria for Skilled Therapeutic Interventions Met (SLP Eval) Yes, treatment indicated   SLP Diagnosis Possible pharyngeal dysphagia s/p Zenker's diverticulotomy   Risks & Benefits of therapy have been explained evaluation/treatment results reviewed;care plan/treatment goals reviewed;risks/benefits reviewed;current/potential barriers reviewed;participants voiced agreement with care plan;participants included;patient;daughter   Clinical Impression Comments   Clinical swallow eval completed per MD order. Pt presents with possible pharyngeal dysphagia s/p Zenker's diverticulotomy, warranting instrumental evaluation. Oral mech exam remarkable for upper denture, in place for eval. Pt assessed with thin liquids via cup and straw, and applesauce. Overt s/sx of aspiration (throat clearing, coughing) with thin liquids via straw, repeated swallows noted with applesauce. Recommend the pt remain NPO with NJ for meds. OK for ice chips/sips water after oral care to promote ongoing attempts at swallowing. Recommend orders for VFSS (will complete 6/29 AM pending radiology schedule). SLP will follow closely.     SLP Total Evaluation Time   Eval: oral/pharyngeal swallow  function, clinical swallow Minutes (36314) 20   SLP Discharge Recommendation home with outpatient speech therapy   SLP Rationale for DC Rec Possible dysphagia   SLP Brief overview of current status  Recommend the pt remain NPO with NJ for meds. OK for ice chips/sips water after oral care to promote ongoing attempts at swallowing. Recommend orders for VFSS (will complete 6/29 AM pending radiology schedule). SLP will follow closely.   Total Session Time   Total Session Time (sum of timed and untimed services) 30

## 2023-06-28 NOTE — PROGRESS NOTES
"   06/28/23 1329   Appointment Info   Signing Clinician's Name / Credentials (PT) Hema Baron, PT, DPT   Rehab Comments (PT) OT order requested 6/28   Living Environment   People in Home alone   Current Living Arrangements apartment   Living Environment Comments Walk in shower, no shower chair   Self-Care   Usual Activity Tolerance good   Current Activity Tolerance moderate   Activity/Exercise/Self-Care Comment IND with mobility and ADLs, walks 4 blocks to [place] regularly. Pts dtr reports baseline cognitive deficits.   General Information   Onset of Illness/Injury or Date of Surgery 06/26/23   Referring Physician Severson, Hayley, MD   Patient/Family Therapy Goals Statement (PT) Return home   Pertinent History of Current Problem (include personal factors and/or comorbidities that impact the POC) Per EMR: \"87 year old female admitted on 6/26/2023. She has a history of Zenker's diverticulum and hypothyroidism and is admitted for post-operative management s/p Zenker's repair.\"   Existing Precautions/Restrictions fall   General Observations Activity: Up Ad Mary   Cognition   Cognitive Status Comments Pt introduced therapist to her daughter in room 3 times throughotu session, oriented to self, required cues for personal birthday, oriented to day of week, stated year as 2020. Pts daughter reports baseline cog deficits but has noticed decline since admission   Integumentary/Edema   Integumentary/Edema no deficits were identifed   Posture    Posture Protracted shoulders   Range of Motion (ROM)   Range of Motion ROM is WFL   Strength (Manual Muscle Testing)   Strength Comments General deconditioning   Bed Mobility   Bed Mobility supine-sit   Supine-Sit [place] (Bed Mobility) supervision   Comment, (Bed Mobility) HOB elevated, appropraite speed   Transfers   Transfers sit-stand transfer   Sit-Stand Transfer   Sit-Stand [place] (Transfers) contact guard   Comment, (Sit-Stand Transfer) Slight unsteadiness, extra " time to reach full standing due to srength deficits   Gait/Stairs (Locomotion)   [place] (Gait) contact guard   Distance in Feet 10   Distance in Feet (Gait) 20, 120   Comment, (Gait/Stairs) Short step length, wide base of support, cautious speed   Balance   Balance Comments Sitting: IND, standing: pt intermittently reaching for UE support   Clinical Impression   Criteria for Skilled Therapeutic Intervention Yes, treatment indicated   PT Diagnosis (PT) impaired mobility   Influenced by the following impairments activity tolerance, balance   Functional limitations due to impairments gait   Clinical Presentation (PT Evaluation Complexity) Stable/Uncomplicated   Clinical Presentation Rationale clinical judgement   Clinical Decision Making (Complexity) low complexity   Planned Therapy Interventions (PT) balance training;bed mobility training;gait training;home exercise program;neuromuscular re-education;strengthening;transfer training;progressive activity/exercise   Risk & Benefits of therapy have been explained evaluation/treatment results reviewed;care plan/treatment goals reviewed;risks/benefits reviewed;current/potential barriers reviewed;participants included;participants voiced agreement with care plan;patient   PT Total Evaluation Time   PT Eval, Low Complexity Minutes (60133) 10   Plan of Care Review   Plan of Care Reviewed With patient;daughter   Physical Therapy Goals   PT Frequency 5x/week   PT Predicted Duration/Target Date for Goal Attainment 07/20/23   PT Goals Bed Mobility;Transfers;Gait   PT: Bed Mobility Independent   PT: Transfers Independent   PT: Gait Independent   Interventions   Interventions Quick Adds Gait Training;Therapeutic Activity   Therapeutic Activity   Therapeutic Activities: dynamic activities to improve functional performance Minutes (26369) 20   Treatment Detail/Skilled Intervention Pt supine upon arrival, agreeable to therapy session, RN ok'd mobility. To progress  functional independence, pt completed transfers. Pt improved efficiency with repetitions, completing toilet transfer with SBA. Edu on progresive return to activity. Discussion held on discharge recs and plan. Pts daughter reported plan for pt to live with pts other daugther until able to live alone safely. Mobility goals establsihed for acute seting including walking and itme in chair.   Gait Training   Gait Training Minutes (59566) 10   Treatment Detail/Skilled Intervention To progress gait, pt ambulated without AD. 2 bouts of walking completed, BP monitored between to assess for OH. BP decreased but remained within normal range, pt denied averse symptoms. Skilled monitoring of tolerance, no overt loss of balance. Pt upright in chair at end of session, call light in reach, denied ufrther needs, chair alarm activated.   [place] Level (Gait Training) contact guard   PT Discharge Planning   PT Plan Progress gait, dynamic balance   PT Discharge Recommendation (DC Rec) home with assist;home with outpatient physical therapy   PT Rationale for DC Rec Pt mobilizing with up to CGA. Pt lives alone at baseline, but plans to live with her daughter upon dischage who can provide 24/7 assist. Recommend home with assist and OP PT to progress balance and activity tolerance.   PT Brief overview of current status Ax1, walk in [place] 3x/day   Total Session Time   Timed Code Treatment Minutes 30   Total Session Time (sum of timed and untimed services) 40

## 2023-06-28 NOTE — PROGRESS NOTES
GASTROENTEROLOGY PROGRESS NOTE    Date of Admission: 6/26/2023  Reason for Admission: Zenker's diverticulum    ASSESSMENT:  87 year old female with a history of hypothyroidism, hiatal hernia, GERD and Zenker's diverticulum.  Admitted 6/26 for monitoring s/p Zenker's diverticulotomy.     #Zenker's diverticulum - s/p diverticulotomy (6/26/2023)  #Concern for dysphagia and aspiration  Patient initially presented with sx of copious phlegm at HS with halitosis and found to have Zenker's diverticulum on VSS and esophagram as well as radiographic e/o aspiration into RLL.  Now s/p Zenker's diverticulotomy on 6/26.  Post procedure soft pressures with NPO status and no IVF/nor TF infusing via NGT.  Post procedure remained on 2 L N.C. overnight for sats 92% but afebrile/no leukocytosis and without s/sx of resp distress.  XR esophagram 6/27 noting limited exam d/t rudi aspiration of contrast and possible small focal outpouching along the anterior aspect of surgical diverticulectomy site is suspicious for a contained leak.  However, per review with Dr. Edouard, appears to just be contrast within the diverticulum and no extravasation into the mediastinum so this is likely of no clinical consequent.  Did note to have rudi aspiration into her airway on esophagram that was not present on prior swallow studies in March. Remains afebrile without leukocytosis, VSS, weaned off O2 support and improved BPs post IVF. Remains on Cipro/Flagyl.     Pending SLP formal evaluation report but per discussion with SLP 6/28, reported on exam did have some e/o coughing after fluids/straw use but less with applesauce and upon review of prior VSS, suspect pt may have been having some aspiration prior to procedure.  Recommend proceed with repeat VSS initially with water-soluble contrast to look for leak and if negative will proceed with evaluation of po fluids/textures.    #Risk for malnutrition (does not meet criteria at this time)  Remains NPO.   TF started via NGT.     Recommendations:  -Follow SLP evaluation and plans for VSS likely on 6/29 (due to scheduling).  -NPO until swallow eval completed with ice chips/sips for pleasure per SLP recs.  -If without e/o dysphagia/aspiration rec the following diet progression vs begin FL/thickened PRN per SLP.               -Begin FL diet x3 days (6/27-6/30) then,               -Soft diet x3 days (7/1-7/3) then,               -Regular diet starting 7/4  -Continue NGT for meds/TF.  Consider removal once able to advance diet.  -No anticoagulation or antiplatelets for 72 hours post procedure.  -Transition to PPI BID suspension via NGT.  Resume PTA Omeprazold 20 mg BID (per pt this is what she takes) upon discharge.  -Discontinue Cipro/Flagyl and monitor temps/WBC off abx.  -Analgesia and antiemetics per primary team.     Discussed with primary medicine team.     Gastroenterology follow up recommendations: (we will arrange)  Follow up with Dr. Edouard in 1 month.     Overall time spent on the date of this encounter preparing to see the patient (including chart review of available notes, clinical status events, imaging and labs); obtaining interim history/subjective data; ordering and/or coordinating medications, tests or procedures; ordering medications, tests or procedures; communicating with other health care professionals; and documenting the above clinical information in the electronic medical record was 50 minutes.     Lali Joshua PA-C  GI Service  [facility]  Text Page  _______________________________________________________________      Subjective: Nursing notes and 24hr events reviewed. Patient seen and examined at 10:30. Patient denies any fevers, chills, N/V, SOB, CP.  Reports ongoing phlegm (noted wet voice quality) but feels less than PTA but daughter feels like coughing/clearing throat more.    ROS:   4 pt ROS negative unless noted in subjective.     Objective:  Blood  "pressure 129/65, pulse 57, temperature 97.8  F (36.6  C), temperature source Oral, resp. rate 18, height 1.575 m (5' 2\"), weight 53.5 kg (117 lb 15.1 oz), SpO2 95 %.    General: Pleasant elderly female sitting at the edge of the bed, in NAD/non-toxic.  Answers appropriately.  Wet voice.  HEENT: Head is AT/NC. Sclera anicteric. No conjunctival injection.  Oropharynx is clear, moist and w/o exudate or lesions. No thrush. Good dentition.  NGT (12 Fr) in nare/bridled in place.  Lungs: Non-labored breathing on RA. No crepitus on palpation.  Heart: Regular rate and rhythm.   Abdomen: Soft, non-tender, non-distended.  No rebound or peritoneal signs.  Extremities: WWP, no pedal edema.   MSK: no gross deformity, no muscle wasting  Skin: No jaundice or rash  Neurologic: Grossly non-focal.  CN 2-12 grossly intact.   Psych: mood appropriate to situation    PROCEDURES:  6/26/2023: EGD under GA with Dr. Edouard  Indications:  For therapy of Zenker's diverticulum                                                                          Findings:        A pediatric gastroscope was advanced from the left nare to the second        part of the duodenum. A ~2 cm Zenker's diverticulum was seen in the        hypopharynx. The remainder of the upper gastrointestinal tract as below.        A 12 Fr nasogastric tube was advanced over the guide wire into the        stomach to facilitate orientation of the esophageal lumen. Placement was        confirmed by scope visualization and the tube was secured to the nose        with a nasal bridal.        Adult gastroscope was then advanced back to the Zenker's        diverticulum/cricopharyngeal bar. The mucosal layer was first incised        using a standard SB knife (Endocut 3-1-1) to expose the cricopharyngeal        bar, which was then incised. Once it was felt that we reached the base        of the cricopharyngeus muscle/diverticulum dissection was stopped. The        base of the dissection was then " closed with 3 hemoclips. No bleeding was        noted at the end of the procedure.        The examined duodenum was normal.        A large hiatal hernia was present.        NG slightly dislodged during extubation and had to be retracted to        remove coil in the mouth. NG tube now at 45 cm at the left nare.      Impression:      - Normal examined duodenum.                          - Large hiatal hernia.                          - Zenker's diverticulum with prominent cricopharyngeal                          bar. NG tube placed for orientation and subsequent                          feeding. Cricopharyngeal myotomy/septotomy                          successfully performed as described above.                          - NG tube slightly dislodged at the end upon                          extubation and repositioned at the bedside.                          - No specimens collected.                          - MODIFER 22 given complexity of procedure requiring                          advanced techniques     LABS:  BMP  Recent Labs   Lab 06/28/23  0702 06/27/23  1652 06/27/23  0610 06/26/23  0701     --  140 140   POTASSIUM 3.5  --  3.7 3.9   CHLORIDE 106  --  106 107   BAYLEE 8.7*  --  8.6* 9.0   CO2 23  --  24 21*   BUN 8.9  --  9.2 15.8   CR 0.57  --  0.74 0.64   * 91 108* 113*     CBC  Recent Labs   Lab 06/28/23  0702 06/27/23  0610 06/26/23  0701   WBC 6.6 8.9 10.0   RBC 3.86 3.70* 4.03   HGB 12.7 12.3 13.3   HCT 38.5 37.4 39.9    101* 99   MCH 32.9 33.2* 33.0   MCHC 33.0 32.9 33.3   RDW 12.8 12.9 12.9    153 161     INRNo lab results found in last 7 days.  LFTs  Recent Labs   Lab 06/27/23  0610   ALKPHOS 68   AST 16   ALT 6   BILITOTAL 1.2   PROTTOTAL 6.1*   ALBUMIN 3.4*      PANCNo lab results found in last 7 days.      IMAGING:  (Personally reviewed)  Results for orders placed or performed during the hospital encounter of 06/26/23   XR Abdomen Port 1 View    Impression    IMPRESSION:  Enteric tube tip projects over the expected location of  the proximal stomach.    I have personally reviewed the examination and initial interpretation  and I agree with the findings.    JESSICA DICKERSON MD         SYSTEM ID:  [id number]   XR Esophagram   Result Value Ref Range    Radiologist flags Suspected contained leak, rudi aspiration of (Urgent)     Impression    IMPRESSION:    1. Limited exam due to rudi aspiration of water-soluble contrast.  Small focal outpouching along the anterior aspect of surgical  diverticulectomy site is suspicious for a contained leak.    2. Irregular small focal outpouching along the surgical site, along  the posterior aspect, may represent opacification of Zenker's  diverticulum remnant.     [Urgent Result: Suspected contained leak, rudi aspiration of  contrast.]    Finding was identified on 6/27/2023 1:25 PM.     Dr. Patrick Edouard was contacted by Dr. Payan at 6/27/2023 1:30 PM  and verbalized understanding of the urgent finding.

## 2023-06-28 NOTE — PROGRESS NOTES
Patient seen and examined today. Esophagram images reviewed and discussed with radiology. Patient doing well post-procedure without pain. Afebrile. No leukocytosis. Radiology concerned about possible contained leak at the septotomy site. To my eye, this appears to just be contrast within the diverticulum. No extravasation into the mediastinum so this is likely of no clinical consequent. However, more concerning appears to be rudi aspiration into her airway. It is unclear at what point this occurred (oropharyngeal, at the level of her cricopharyngeal myotomy, or gastroesophageal reflux). This was not present on her prior swallow study in March. Will ask speech pathology to see and assess.     Recommend NG remain in place for now and tube feeds should have been started yesterday for nutrition. Reasonable to continue antibiotics into tomorrow but if continues to be afebrile, asymptomatic, and no leukocytosis can likely stop.    PE  HEENT: no neck crepitus, nontender, no LAD      Patrick Edouard MD  [facility]  Division of Gastroenterology and Hepatology  [facility] [address], [place] [address]

## 2023-06-28 NOTE — PROGRESS NOTES
[facility]    Progress Note - Medicine Service, MARDAR[facility] TEAM 2       Date of Admission:  6/26/2023    Assessment & Plan   Manuel Romero is a 87 year old female admitted on 6/26/2023. She has a history of Zenker's diverticulum and hypothyroidism and is admitted for post-operative management s/p Zenker's repair.    Today:  - NPO only ice chips for comfort; continue NG tube feedings  - Esophagram with aspiration --> SLP consult, recommend video swallow  - Stop Abx and monitor off per GI     Zenker's diverticulum s/p cricopharyngeal myotomy/septotomy  Large hiatal hernia  Patient s/p Zenker's diverticulotomy with GI 6/26 AM. Tolerated well. Admitted for observation overnight and evaluation for leak on POD1. Unfortunately, had ruid aspiration on esophagram and c/f for possible leak.   - NG tube placement --> ok to use for meds and tube feedings based on Nutrition recs.   - Esophagram with aspiration --> SLP consult, recommending VFSS  - Ice chips for comfort. Otherwise NPO.   - No anticoagulation or antiplatelets for 72 hours.   - Head of bed elevation to 30-45 degrees.   - BID IV PPI while inpatient.   - Discontinue cipro/Flagyl per GI  - Tylenol PRN for pain; will escalate prn  - CBC and BMP in AM    Acute hypoxic respiratory failure, resolved  Low oxygen requirement after surgery (1-2L NC).  - Continuous pulse ox  - Oxygen therapy for SpO2 goal >92%     Hypothyroidism  - PTA levothyroxine     PACs  Sinus bradycardia  Well perfused. EKG confirms PACs and sinus arrhythmia. No intervention if asx and well-perfused.        Diet: NPO for Medical/Clinical Reasons Except for: Ice Chips, NPO but receiving Tube Feeding  Adult Formula Drip Feeding: Continuous Jevity 1.5; Nasogastric tube; Goal Rate: Start TF @ 10 mL/hr and advance by 10 mL q8h as tolerated to goal rate 40 mL/hr; mL/hr; Do not advance tube feeding rate unless K+ is = or > 3.0, Mg++ is = or > 1.5, a...    DVT  Prophylaxis: Pneumatic Compression Devices  Stinson Catheter: Not present  Fluids: LR  Lines: None     Cardiac Monitoring: None  Code Status: Full Code      Clinically Significant Risk Factors              # Hypoalbuminemia: Lowest albumin = 3.4 g/dL at 6/27/2023  6:10 AM, will monitor as appropriate                     Disposition Plan      Expected Discharge Date: 06/30/2023        Discharge Comments: starting TF, speech to see        The patient's care was discussed with the Attending Physician, Dr. Christy.    Hayley Severson, MD-MPH, PGY-2  Medicine Service, [address]  Securely message with Vocera (more info)  Text page via [employer] Paging/Directory   See signed in provider for up to date coverage information  ______________________________________________________________________    Interval History   Nursing notes reviewed. NAEO. Off of oxygen this morning and feeling well. No chest pain, no back pain, no abdominal pain. Still coughing some phlegm.      Physical Exam   Vital Signs: Temp: 97.8  F (36.6  C) Temp src: Oral BP: 129/65 Pulse: 57   Resp: 18 SpO2: 95 % O2 Device: None (Room air) Oxygen Delivery: 1 LPM  Weight: 117 lbs 15.14 oz    General Appearance:  Alert, awake, well-appearing, in no acute distress  Respiratory: CTAB, no increased work of breathing  Cardiovascular: Additional beats intermittently, no murmur noted  GI: Soft, non-tender, non-distended  Skin: No rashes or other lesions noted  Neuro: No focal deficits     Medical Decision Making     Please see A&P for additional details of medical decision making.      Data   ------------------------- PAST 24 HR DATA REVIEWED -----------------------------------------------

## 2023-06-29 ENCOUNTER — APPOINTMENT (OUTPATIENT)
Dept: SPEECH THERAPY | Facility: CLINIC | Age: 88
DRG: 391 | End: 2023-06-29
Attending: INTERNAL MEDICINE
Payer: COMMERCIAL

## 2023-06-29 ENCOUNTER — APPOINTMENT (OUTPATIENT)
Dept: OCCUPATIONAL THERAPY | Facility: CLINIC | Age: 88
DRG: 391 | End: 2023-06-29
Attending: INTERNAL MEDICINE
Payer: COMMERCIAL

## 2023-06-29 VITALS
DIASTOLIC BLOOD PRESSURE: 77 MMHG | HEIGHT: 62 IN | SYSTOLIC BLOOD PRESSURE: 142 MMHG | HEART RATE: 70 BPM | RESPIRATION RATE: 16 BRPM | WEIGHT: 117.95 LBS | OXYGEN SATURATION: 95 % | TEMPERATURE: 97.6 F | BODY MASS INDEX: 21.7 KG/M2

## 2023-06-29 LAB
ALBUMIN SERPL BCG-MCNC: 3.1 G/DL (ref 3.5–5.2)
ALP SERPL-CCNC: 72 U/L (ref 35–104)
ALT SERPL W P-5'-P-CCNC: 6 U/L (ref 0–50)
ANION GAP SERPL CALCULATED.3IONS-SCNC: 10 MMOL/L (ref 7–15)
AST SERPL W P-5'-P-CCNC: 18 U/L (ref 0–45)
BILIRUB SERPL-MCNC: 0.4 MG/DL
BUN SERPL-MCNC: 10.3 MG/DL (ref 8–23)
CALCIUM SERPL-MCNC: 8.7 MG/DL (ref 8.8–10.2)
CHLORIDE SERPL-SCNC: 108 MMOL/L (ref 98–107)
CREAT SERPL-MCNC: 0.52 MG/DL (ref 0.51–0.95)
DEPRECATED HCO3 PLAS-SCNC: 24 MMOL/L (ref 22–29)
ERYTHROCYTE [DISTWIDTH] IN BLOOD BY AUTOMATED COUNT: 12.7 % (ref 10–15)
GFR SERPL CREATININE-BSD FRML MDRD: 90 ML/MIN/1.73M2
GLUCOSE SERPL-MCNC: 135 MG/DL (ref 70–99)
HCT VFR BLD AUTO: 37.4 % (ref 35–47)
HGB BLD-MCNC: 12.6 G/DL (ref 11.7–15.7)
MAGNESIUM SERPL-MCNC: 1.9 MG/DL (ref 1.7–2.3)
MCH RBC QN AUTO: 33.2 PG (ref 26.5–33)
MCHC RBC AUTO-ENTMCNC: 33.7 G/DL (ref 31.5–36.5)
MCV RBC AUTO: 98 FL (ref 78–100)
PHOSPHATE SERPL-MCNC: 2.6 MG/DL (ref 2.5–4.5)
PLATELET # BLD AUTO: 158 10E3/UL (ref 150–450)
POTASSIUM SERPL-SCNC: 3.3 MMOL/L (ref 3.4–5.3)
PROT SERPL-MCNC: 5.9 G/DL (ref 6.4–8.3)
RBC # BLD AUTO: 3.8 10E6/UL (ref 3.8–5.2)
SODIUM SERPL-SCNC: 142 MMOL/L (ref 136–145)
WBC # BLD AUTO: 4.7 10E3/UL (ref 4–11)

## 2023-06-29 PROCEDURE — 97535 SELF CARE MNGMENT TRAINING: CPT | Mod: GO

## 2023-06-29 PROCEDURE — 36415 COLL VENOUS BLD VENIPUNCTURE: CPT | Performed by: STUDENT IN AN ORGANIZED HEALTH CARE EDUCATION/TRAINING PROGRAM

## 2023-06-29 PROCEDURE — 250N000013 HC RX MED GY IP 250 OP 250 PS 637: Performed by: STUDENT IN AN ORGANIZED HEALTH CARE EDUCATION/TRAINING PROGRAM

## 2023-06-29 PROCEDURE — 250N000011 HC RX IP 250 OP 636: Performed by: STUDENT IN AN ORGANIZED HEALTH CARE EDUCATION/TRAINING PROGRAM

## 2023-06-29 PROCEDURE — 97530 THERAPEUTIC ACTIVITIES: CPT | Mod: GO

## 2023-06-29 PROCEDURE — 99239 HOSP IP/OBS DSCHRG MGMT >30: CPT | Mod: GC | Performed by: STUDENT IN AN ORGANIZED HEALTH CARE EDUCATION/TRAINING PROGRAM

## 2023-06-29 PROCEDURE — 250N000013 HC RX MED GY IP 250 OP 250 PS 637

## 2023-06-29 PROCEDURE — 80053 COMPREHEN METABOLIC PANEL: CPT | Performed by: STUDENT IN AN ORGANIZED HEALTH CARE EDUCATION/TRAINING PROGRAM

## 2023-06-29 PROCEDURE — C9113 INJ PANTOPRAZOLE SODIUM, VIA: HCPCS | Performed by: STUDENT IN AN ORGANIZED HEALTH CARE EDUCATION/TRAINING PROGRAM

## 2023-06-29 PROCEDURE — 83735 ASSAY OF MAGNESIUM: CPT

## 2023-06-29 PROCEDURE — 99233 SBSQ HOSP IP/OBS HIGH 50: CPT | Performed by: DIETITIAN, REGISTERED

## 2023-06-29 PROCEDURE — 92526 ORAL FUNCTION THERAPY: CPT | Mod: GN

## 2023-06-29 PROCEDURE — 84100 ASSAY OF PHOSPHORUS: CPT

## 2023-06-29 PROCEDURE — 97165 OT EVAL LOW COMPLEX 30 MIN: CPT | Mod: GO

## 2023-06-29 PROCEDURE — 85027 COMPLETE CBC AUTOMATED: CPT | Performed by: STUDENT IN AN ORGANIZED HEALTH CARE EDUCATION/TRAINING PROGRAM

## 2023-06-29 RX ADMIN — LEVOTHYROXINE SODIUM 75 MCG: 75 TABLET ORAL at 11:18

## 2023-06-29 RX ADMIN — LIDOCAINE PATCH 4% 1 PATCH: 40 PATCH TOPICAL at 11:19

## 2023-06-29 RX ADMIN — Medication 12.5 MG: at 01:17

## 2023-06-29 RX ADMIN — PANTOPRAZOLE SODIUM 40 MG: 40 INJECTION, POWDER, FOR SOLUTION INTRAVENOUS at 11:19

## 2023-06-29 ASSESSMENT — ACTIVITIES OF DAILY LIVING (ADL)
ADLS_ACUITY_SCORE: 28
ADLS_ACUITY_SCORE: 28
PREVIOUS_RESPONSIBILITIES: MEAL PREP;HOUSEKEEPING;LAUNDRY;MEDICATION MANAGEMENT
ADLS_ACUITY_SCORE: 28

## 2023-06-29 NOTE — PLAN OF CARE
Goal Outcome Evaluation:      Plan of Care Reviewed With: patient    Overall Patient Progress: improvingOverall Patient Progress: improving    A/O to self only. Forgetful, sitter was in place for safety.  Patient denies pain and nausea.  Speech consulted, advanced to full liquid diet.  NJ removed.  Patient tolerating diet.  PIV removed. Voiding, not saving.  +BS, had 1 BM this morning.  AVS printed and reviewed with daughters and patient. Verbalized understanding discharge education.  Patient left with daughters and all her belongings.

## 2023-06-29 NOTE — PLAN OF CARE
Physical Therapy Discharge Summary    Reason for therapy discharge:    Discharged to home with outpatient therapy.    Progress towards therapy goal(s). See goals on Care Plan in [facility] electronic health record for goal details.  Goals partially met.  Barriers to achieving goals:   discharge from facility.    Therapy recommendation(s):    Continued therapy is recommended.  Rationale/Recommendations:  home safety, balance, strength.

## 2023-06-29 NOTE — PLAN OF CARE
"Speech Language Therapy Discharge Summary    Reason for therapy discharge:    Discharged to home with outpatient therapy.    Progress towards therapy goal(s). See goals on Care Plan in Epic electronic health record for goal details.  Goals partially met.  Barriers to achieving goals:   discharge from facility.    Therapy recommendation(s):    Continued therapy is recommended.  Rationale/Recommendations:     Pt had an esophagram and limited VFSS this admission which revealed aspiration. After extensive discussion with pt and pt's daughters, recommend the pt initiate a full liquid diet, thin liquids OK. OK to advance the pt's diet past full liquids according to GI surgery recs. Pt appears to be at a similar risk for both thin and thick liquids. Prior to discharge from inpatient, extensive training was provided re: safe-swallowing strategies, dysphagia exercises, and strategies to reduce risk for developing an aspiration pneumonia; pt will require reinforcement. Recommend the pt be fully upright and alert for all PO, taking small bites/sips. Caregivers to encourage IND completion of swallowing exercises. Recommend OP SLP f/u for repeat VFSS and tx if indicated.    Limited VFSS from 6/28 revealed \"mild oropharyngeal dysphagia. Oral phase WFL. Swallow trigger delayed; this is a change from previous study in March. Once triggered, BOT retraction and pharyngeal stripping wave are adequate. Epiglottic inversion partial (c/w previous study). Reduced laryngeal vestibule closure (a change from previous study). Deep penetration and aspiration occurs during and after the swallow with thin liquids, despite attempting chin tuck strategy. Pt demonstrates a weak reflexive cough to aspiration, which does not clear aspirated material.\"  "

## 2023-06-29 NOTE — PLAN OF CARE
SLP: Pt seen today for bedside evaluation of swallowing, daughters present. Reviewed in detail the results of both the previous VFSS from March and the VFSS from yesterday. Discussed that delayed swallow and the reduced LVC were different from pt's initial VFSS. Pt's daughter expressed a suspicion that pt aspirates at baseline, but SLP reiterated that the previous study from March did not indicate this. After extensive discussion with pt and pt's daughters, recommend the pt initiate a full liquid diet, thin liquids OK. MD is OK to advance the pt's diet past full liquids according to GI surgery recs. Pt appears to be at a similar risk for both thin and thick liquids. Extensive training provided re: safe-swallowing strategies, dysphagia exercises, and strategies to reduce risk for developing an aspiration pneumonia; pt will require reinforcement Recommend the pt be fully upright and alert for all PO, taking small bites/sips. Caregivers to encourage IND completion of swallowing exercises. SLP will follow for diet tolerance while inpatient, recommend OP SLP consult. Discussed with GI and medicine team.

## 2023-06-29 NOTE — PROGRESS NOTES
GASTROENTEROLOGY PROGRESS NOTE    Date of Admission: 6/26/2023  Reason for Admission: Zenker's diverticulum    ASSESSMENT:  87 year old female with a history of hypothyroidism, hiatal hernia, GERD and Zenker's diverticulum.  Admitted 6/26 for monitoring s/p Zenker's diverticulotomy.     #Zenker's diverticulum - s/p diverticulotomy (6/26/2023)  #Concern for dysphagia and aspiration  Patient initially presented with sx of copious phlegm at HS with halitosis and found to have Zenker's diverticulum on VSS and esophagram as well as radiographic e/o aspiration into RLL.  Now s/p Zenker's diverticulotomy on 6/26.  Post procedure soft pressures with NPO status and no IVF/nor TF infusing via NGT.  Post procedure remained on 2 L N.C. overnight for sats 92% but afebrile/no leukocytosis and without s/sx of resp distress.  XR esophagram 6/27 noting limited exam d/t rudi aspiration of contrast and possible small focal outpouching along the anterior aspect of surgical diverticulectomy site is suspicious for a contained leak.  However, per review with Dr. Edouard, appears to just be contrast within the diverticulum and no extravasation into the mediastinum so this is likely of no clinical consequent.  Did note to have rudi aspiration into her airway on esophagram that was not present on prior swallow studies in March. Remains afebrile without leukocytosis, VSS, weaned off O2 support and improved BPs post IVF. Remains on Cipro/Flagyl.     Pending SLP formal evaluation report but per discussion with SLP 6/28, reported on exam did have some e/o coughing after fluids/straw use but less with applesauce and upon review of prior VSS, suspect pt may have been having some aspiration prior to procedure.  Repeated abbreviated VFSS today with thin water soluble contrast but radiology deferred trials of PO textures given previous esophagram read of c/f leak.  Per discussion with Dr. Edouard/, continue to have low suspicion for  leak with suspected contrast in residual pouch post Zenker's procedure (not contrast extravasation) and recs/plan made with SLP to proceed with bedside swallow eval 6/29 with thickened liquids/modifications to see if improves swallow safety/function and can advance diet.    Per SLP bedside re-eval and discussion with pt's dtrs, suspect may have some aspiration at baseline and now with delayed swallow/reduced LVC (different from initial VSS) but rec advance to FL diet (noted similar risk for aspiration with thin and thickened liquids) and education done for exercises and safe strategies to reduce risk of aspiration and pneumonia.  Recommend ongoing follow up and SLP therapy OP.    #Risk for malnutrition (does not meet criteria at this time)  Remains NPO.  TF started via NGT.     Recommendations:  -Advance diet to FL per SLP recs.  -Upon discharge, recommend continue with the following diet progression:               -Begin FL diet x3 days (6/27-6/30) then,               -Soft diet x3 days (7/1-7/3) then,               -Regular diet starting 7/4  -Remove NGT.  -No anticoagulation or antiplatelets for 72 hours post procedure.  -Resume PTA Omeprazold 20 mg BID (per pt this is what she takes) upon discharge.  -Analgesia and antiemetics per primary team.  -Follow up with OP SLP therapy as recommended  -Appropriate for discharge from GI perspective.  -Education completed with pt/dtrs on sx to monitor for for pneumonia and plans for follow up.  -GI contact information provided in discharge instructions.     Discussed with primary medicine team.     Gastroenterology follow up recommendations: (we will arrange)  Follow up with Dr. Edouard in 1 month.     Overall time spent on the date of this encounter preparing to see the patient (including chart review of available notes, clinical status events, imaging and labs); obtaining interim history/subjective data; ordering and/or coordinating medications, tests or procedures;  "ordering medications, tests or procedures; communicating with other health care professionals; and documenting the above clinical information in the electronic medical record was 50 minutes.     Lali Joshua PA-C  GI Service  [facility]  Text Page  _______________________________________________________________      Subjective: Nursing notes and 24hr events reviewed. Patient seen and examined at 10:45 with SLP at bedside completing eval.  Reports some coughing and throat clearing after po but same with thin and thick liquids.  Denies fever, chills, CP, SOB, N/V.  C/o hunger and wants to eat/drink and get NGT out and go home.    ROS:   4 pt ROS negative unless noted in subjective.     Objective:  Blood pressure 133/63, pulse 62, temperature 98.2  F (36.8  C), temperature source Oral, resp. rate 16, height 1.575 m (5' 2\"), weight 53.5 kg (117 lb 15.1 oz), SpO2 93 %.    General: Pleasant elderly female sitting at the edge of the bed, in NAD/non-toxic.  Answers appropriately.  Wet voice quality post swallow.  HEENT: Head is AT/NC. Sclera anicteric. No conjunctival injection.  Oropharynx is clear, moist and w/o exudate or lesions. No thrush. Good dentition.  NGT (12 Fr) in nare/bridled in place.  Lungs: Non-labored breathing on RA. No crepitus on palpation.  Heart: Regular rate and rhythm.   Abdomen: Soft, non-tender, non-distended.  No rebound or peritoneal signs.  Extremities: WWP, no pedal edema.   MSK: no gross deformity, no muscle wasting  Skin: No jaundice or rash  Neurologic: Grossly non-focal.  CN 2-12 grossly intact.   Psych: mood appropriate to situation    PROCEDURES:  6/26/2023: EGD under GA with Dr. Edouard  Indications:  For therapy of Zenker's diverticulum                                                                          Findings:        A pediatric gastroscope was advanced from the left nare to the second        part of the duodenum. A ~2 cm Zenker's diverticulum " was seen in the        hypopharynx. The remainder of the upper gastrointestinal tract as below.        A 12 Fr nasogastric tube was advanced over the guide wire into the        stomach to facilitate orientation of the esophageal lumen. Placement was        confirmed by scope visualization and the tube was secured to the nose        with a nasal bridal.        Adult gastroscope was then advanced back to the Zenker's        diverticulum/cricopharyngeal bar. The mucosal layer was first incised        using a standard SB knife (Endocut 3-1-1) to expose the cricopharyngeal        bar, which was then incised. Once it was felt that we reached the base        of the cricopharyngeus muscle/diverticulum dissection was stopped. The        base of the dissection was then closed with 3 hemoclips. No bleeding was        noted at the end of the procedure.        The examined duodenum was normal.        A large hiatal hernia was present.        NG slightly dislodged during extubation and had to be retracted to        remove coil in the mouth. NG tube now at 45 cm at the left nare.      Impression:      - Normal examined duodenum.                          - Large hiatal hernia.                          - Zenker's diverticulum with prominent cricopharyngeal                          bar. NG tube placed for orientation and subsequent                          feeding. Cricopharyngeal myotomy/septotomy                          successfully performed as described above.                          - NG tube slightly dislodged at the end upon                          extubation and repositioned at the bedside.                          - No specimens collected.                          - MODIFER 22 given complexity of procedure requiring                          advanced techniques     LABS:  [facility]  Recent Labs   Lab 06/29/23  0651 06/28/23  0702 06/27/23  1652 06/27/23  0610 06/26/23  0701    140  --  140 140   POTASSIUM 3.3* 3.5  --  3.7  3.9   CHLORIDE 108* 106  --  106 107   BAYLEE 8.7* 8.7*  --  8.6* 9.0   CO2 24 23  --  24 21*   BUN 10.3 8.9  --  9.2 15.8   CR 0.52 0.57  --  0.74 0.64   * 111* 91 108* 113*     CBC  Recent Labs   Lab 06/29/23  0651 06/28/23  0702 06/27/23  0610 06/26/23  0701   WBC 4.7 6.6 8.9 10.0   RBC 3.80 3.86 3.70* 4.03   HGB 12.6 12.7 12.3 13.3   HCT 37.4 38.5 37.4 39.9   MCV 98 100 101* 99   MCH 33.2* 32.9 33.2* 33.0   MCHC 33.7 33.0 32.9 33.3   RDW 12.7 12.8 12.9 12.9    161 153 161     INRNo lab results found in last 7 days.  LFTs  Recent Labs   Lab 06/29/23  0651 06/27/23  0610   ALKPHOS 72 68   AST 18 16   ALT 6 6   BILITOTAL 0.4 1.2   PROTTOTAL 5.9* 6.1*   ALBUMIN 3.1* 3.4*      PANCNo lab results found in last 7 days.      IMAGING:  (Personally reviewed)  Results for orders placed or performed during the hospital encounter of 06/26/23   XR Abdomen Port 1 View    Impression    IMPRESSION: Enteric tube tip projects over the expected location of  the proximal stomach.    I have personally reviewed the examination and initial interpretation  and I agree with the findings.    JESSICA DICKERSON MD         SYSTEM ID:  [id number]   XR Esophagram   Result Value Ref Range    Radiologist flags Suspected contained leak, rudi aspiration of (Urgent)     Impression    IMPRESSION:    1. Limited exam due to rudi aspiration of water-soluble contrast.  Small focal outpouching along the anterior aspect of surgical  diverticulectomy site is suspicious for a contained leak.    2. Irregular small focal outpouching along the surgical site, along  the posterior aspect, may represent opacification of Zenker's  diverticulum remnant.     [Urgent Result: Suspected contained leak, rudi aspiration of  contrast.]    Finding was identified on 6/27/2023 1:25 PM.     Dr. Patrick Javan was contacted by Dr. Payan at 6/27/2023 1:30 PM  and verbalized understanding of the urgent finding.

## 2023-06-29 NOTE — PLAN OF CARE
"BP (!) 151/66 (BP Location: Right arm)   Pulse 64   Temp 97.6  F (36.4  C) (Axillary)   Resp 18   Ht 1.575 m (5' 2\")   Wt 53.5 kg (117 lb 15.1 oz)   SpO2 95%   BMI 21.57 kg/m      Status: S/p Zenker's diverticulum repair + NG placement  Activity: Ax1 + gb  Neuros: A&Ox1-2; disoriented to place/situation/time, but pleasant and easily reoriented. Bedside attendant present for safety and to prevent pulling at lines.  Cardiac: WDL, denies cardiac chest pain.  Respiratory: WDL on RA, denies SOB.  GI/: +BS, +gas, +BM today. Voids spont with AUOP.  Diet: NPO + TF 22 hours/day from 3450-9332 at goal rate of 45mL/hr + 60mL water flush Q4.  Skin/Incisions: WDL.  Lines/Drains: R PIV SL. NG CDI with TF running.  Pain/Nausea: Denies.  New Changes: No acute changes this shift; slept comfortably between cares.  Plan: NG tube removal and diet advancement today pending swallow study results.    "

## 2023-06-29 NOTE — PROGRESS NOTES
"   06/29/23 1310   Appointment Info   Signing Clinician's Name / Credentials (OT) Nenita Mast, OTR/L   Living Environment   People in Home alone   Current Living Arrangements apartment   Home Accessibility no concerns   Transportation Anticipated family or friend will provide   Living Environment Comments Pt lives in one level apartment, no stairs to manage. Has walk in shower, shower chair, grab bars, and raised toilet seat   Self-Care   Usual Activity Tolerance good   Current Activity Tolerance moderate   Regular Exercise Yes   Activity/Exercise Type walking   Exercise Amount/Frequency 3-5 times/wk   Equipment Currently Used at Home none   Fall history within last six months no   Activity/Exercise/Self-Care Comment Pt reports IND in ADLs prior to admission. Walks to [place] multiple times a week (5 blocks away from home). Has 24/7 assist from [place]   Instrumental Activities of Daily Living (IADL)   Previous Responsibilities meal prep;housekeeping;laundry;medication management   IADL Comments Pt IND in IADLs except transportation prior to admission. Receives assistance with driving from friends or family.   General Information   Onset of Illness/Injury or Date of Surgery 06/26/23   Referring Physician Severson, Hayley, MD   Patient/Family Therapy Goal Statement (OT) Return home   Additional Occupational Profile Info/Pertinent History of Current Problem Per EMR, \"87 year old female with a history of hypothyroidism, hiatal hernia, GERD and Zenker's diverticulum.  Admitted 6/26 for monitoring s/p Zenker's diverticulotomy\"   Existing Precautions/Restrictions no known precautions/restrictions   Limitations/Impairments safety/cognitive   Left Upper Extremity (Weight-bearing Status) full weight-bearing (FWB)   Right Upper Extremity (Weight-bearing Status) full weight-bearing (FWB)   Left Lower Extremity (Weight-bearing Status) full weight-bearing (FWB)   Right Lower Extremity (Weight-bearing Status) full " "weight-bearing (FWB)   General Observations and Info Activity: up ad flynn   Cognitive Status Examination   Orientation Status orientation to person, place and time   Cognitive Status Comments Pt not oriented to year reporting 2020, pt dtr reporting pt \"never knows the correct year\". Otherwise is oriented to self, place, and situation. Noted increased processing at times and memory deficits, recommended having person available for assist with IADLs and transfers for safety   Visual Perception   Visual Impairment/Limitations WFL   Sensory   Sensory Quick Adds sensation intact   Pain Assessment   Patient Currently in Pain No   Posture   Posture forward head position;protracted shoulders   Range of Motion Comprehensive   General Range of Motion no range of motion deficits identified   Strength Comprehensive (MMT)   Comment, General Manual Muscle Testing (MMT) Assessment General weakness   Coordination   Upper Extremity Coordination No deficits were identified   Bed Mobility   Bed Mobility rolling right;supine-sit   Rolling Right [place] (Bed Mobility) modified independence   Supine-Sit [place] (Bed Mobility) modified independence   Comment (Bed Mobility) Increased time and effort d/t weakness however completing with HOB elevated with Katia   Transfers   Transfers sit-stand transfer;toilet transfer   Sit-Stand Transfer   Sit-Stand [place] (Transfers) supervision   Toilet Transfer   Type (Toilet Transfer) sit-stand;stand-sit   [place]ole Level (Toilet Transfer) supervision;verbal cues   Toilet Transfer Comments Cues for body mechanics   Balance   Balance Assessment standing balance: dynamic   Balance Comments CGA   Activities of Daily Living   BADL Assessment/Intervention bathing;lower body dressing;grooming;toileting   Bathing Assessment/Intervention   [place] Level (Bathing) contact guard assist   Comment, (Bathing) per clinical judgement   Lower Body Dressing Assessment/Training   Comment, (Lower " Body Dressing) donning/doffing socks and shoes, crossing LEs to reach   Ness Level (Lower Body Dressing) supervision;set up   Grooming Assessment/Training   Ness Level (Grooming) supervision   Comment, (Grooming) sink side g/h tasks   Toileting   Ness Level (Toileting) supervision   Clinical Impression   Criteria for Skilled Therapeutic Interventions Met (OT) Evaluation only   OT Diagnosis Dec IND in I/ADLs   OT Problem List-Impairments impacting ADL problems related to;balance   Clinical Decision Making Complexity (OT) low complexity   Risk & Benefits of therapy have been explained evaluation/treatment results reviewed;care plan/treatment goals reviewed;current/potential barriers reviewed;risks/benefits reviewed;participants voiced agreement with care plan;participants included;patient;daughter   Clinical Impression Comments Pt appears near functional baseline. No further acute OT needs at this time, is completing ADLs with Katia-SBA and functional mobility with CGA no AD. Will benefit from OP PT to address balance deficits.   OT Total Evaluation Time   OT Eval, Low Complexity Minutes (81264) 9   OT Discharge Planning   OT Plan dc   OT Discharge Recommendation (DC Rec) home with assist  (OP PT)   OT Rationale for DC Rec Pt appears near functional baseline with ADLs. Limited by weakness impacting balance this date. Anticipate pt will be safe for return home with OP PT to address balance and strength deficits. No further acute OT needs at this time. Will sign off.   OT Brief overview of current status CGA in valdovinos, SBA ADLs   Total Session Time   Timed Code Treatment Minutes 35   Total Session Time (sum of timed and untimed services) 44

## 2023-06-29 NOTE — PROGRESS NOTES
Care Management Discharge Note    Discharge Date: 06/29/2023       Discharge Disposition: Home with OP f/u     Discharge Services: OP speech     Discharge DME: NA     Discharge Transportation: family or friend will provide    Private pay costs discussed: Not applicable    PAS Confirmation Code: NA   Patient/family educated on Medicare website which has current facility and service quality ratings: NA    Education Provided on the Discharge Plan: Pt was discharged prior to seeing pt    Persons Notified of Discharge Plans: pt and family, by bedside nurse  Patient/Family in Agreement with the Plan: yes     Handoff Referral Completed: Yes    Additional Information:  RNCC received a call from speech therapy who reports pt will need OP speech follow up.  Pt would prefer to f/u with [facility] in [place].  Speech referral sent to [facility] [place](F: [phone]), they will call pt to schedule.        Venecia Alarcon, MERLE  Phone: [phone]  Pager: [phone]    SEARCHABLE in [facility] - search CARE COORDINATOR     [place] ([id number]) Saturday & Sunday; ([phone]) [facility] Recognized Holidays     Units: 4A, 4C, 4E, 5A & 5B   Pager: [phone]    Units: 6A & 6B    Pager: [phone]    Units: 6C & 6D   Pager: [phone]    Units: 7A, 7B, 7C, 7D & 5C    Pager: [phone]    Units: [facility] ED, 5 Ortho, 5 Med/Surg, 6 Med/Surg, 8A, 10 ICU, & [facility]    Pager: [phone]

## 2023-07-06 ENCOUNTER — PATIENT OUTREACH (OUTPATIENT)
Dept: GASTROENTEROLOGY | Facility: CLINIC | Age: 88
End: 2023-07-06
Payer: COMMERCIAL

## 2023-07-06 NOTE — TELEPHONE ENCOUNTER
Post op call, left  with daughter Therese. No answer on home number. Follow up previously arranged for 8/9/23    1515  Pt daughter called back to report that Manuel has been feeling well since procedure, is currently at her son's cabin. Has not had any s/s of aspiration since procedure. Eating and drinking without problem.  Confirmed appt on 8/9.    Tara Tejeda, RN, BSN,   [facility]  Care coordinator

## 2023-08-01 ENCOUNTER — DOCUMENTATION ONLY (OUTPATIENT)
Dept: GASTROENTEROLOGY | Facility: CLINIC | Age: 88
End: 2023-08-01
Payer: COMMERCIAL

## 2023-08-01 NOTE — PROGRESS NOTES
Called PT's daughter (Therese) and left VM.    Called to remind patient of their upcoming appointment with our GI clinic, on 08/09/23 at 7:40 AM with Dr. Patrick Edouard. This appointment is scheduled as a video visit. You will receive a call approximately 30 minutes prior to check you in, you must be in [place] for this visit., if your appointment is virtual (video or telephone) you need to be in [place] for the visit. To reschedule or cancel patient to call [phone].      SK

## 2023-08-01 NOTE — PROGRESS NOTES
Patient's daughter called and confirmed the upcoming appointment with our GI clinic, on 08/09/23 at 7:40 AM with Dr. Patrick Edouard. This appointment is scheduled as a video visit. You will receive a call approximately 30 minutes prior to check you in, you must be in [place] for this visit., if your appointment is virtual (video or telephone) you need to be in [place] for the visit. To reschedule or cancel patient to call [phone].

## 2023-08-09 ENCOUNTER — VIRTUAL VISIT (OUTPATIENT)
Dept: GASTROENTEROLOGY | Facility: CLINIC | Age: 88
End: 2023-08-09
Attending: INTERNAL MEDICINE
Payer: COMMERCIAL

## 2023-08-09 DIAGNOSIS — K22.5 ZENKER DIVERTICULUM: Primary | ICD-10-CM

## 2023-08-09 PROCEDURE — 99213 OFFICE O/P EST LOW 20 MIN: CPT | Mod: VID | Performed by: INTERNAL MEDICINE

## 2023-08-09 NOTE — LETTER
8/9/2023         RE: Manuel Romero  [address]  [facility] [address]        Dear Colleague,    Thank you for referring your patient, Manuel Romero, to the [facility]. Please see a copy of my visit note below.    I, Patrick Edouard MD, was with the fellow, Shaan Hermosillo MD, on this video visit (critical and key portions or time) on 08/9/23 and agree with the fellow's findings and plan of care as documented in the fellow's note.I reviewed the note and there are no changes to the past medical, family or social history.    I spent an additional 15 minutes of non face-to-face time in review of the patients medical records to date. The included review of prior hospital visits, imaging, labs, and procedures. The findings are summarized in my note below.     Clinical resolution of symptoms following intervention. Can discontinue omeprazole and see if she needs it if heartburn/reflux occur. Repeat swallow study showed no aspiration. Follow up as needed if symptom recurrence.     Patrick Edouard MD  [facility]  Division of Gastroenterology and Hepatology  [facility] [address] [id number]    GI CLINIC VISIT    CC/REFERRING MD:  No ref. provider found  REASON FOR CONSULTATION:   No ref. provider found for   Chief Complaint   Patient presents with    RECHECK     Follow up       ASSESSMENT/PLAN:    Ms. Romero is an 88 years old F with PMH of GERD, large hiatal hernia, prior Zenker's diverticulum s/p diverticulotomy, hypothyroidism who presents to GI clinic through virtual visit for follow-up after recent hospitalization.    #Zenker's diverticulum s/p diverticulotomy    Patient presents for follow-up after recent hospitalization. Symptoms have now resolved, patient able to tolerate regular diet, weight has been stable, no issues with nutrition. Recent OFSS by SLP on 08/03 showed no aspiration or issues with swallowing. At  this time, no further follow-up is required, unless patient develops any symptoms.       RTC PRN    This note was created with voice recognition software, and while reviewed for accuracy, typos may remain.     Shaan Hermosillo MD  Gastroenterology Fellow PGY-4  Division of Gastroenterology, Hepatology and Nutrition  [facility]      HPI    Ms. Romero is an 88 years old F with PMH of GERD, large hiatal hernia, prior Zenker's diverticulum s/p diverticulotomy, hypothyroidism who presents to GI clinic through virtual visit for follow-up after recent hospitalization.    Recently underwent Zenker's diverticulotomy on 06/26 by Dr. Edouard. Patient then remained in the hospital for a couple more days due to concern for leak, which was proven to likely be contrast in residual pouch and not extravasation. Also with some concern for aspiration on follow-up esophagram, with SLP following. Eventually had bedside swallow eval by SLP which was successful, diet was advanced to full liquid diet. Patient was then discharged with dietary instructions, eventually advanced to regular diet on 07/04. Also started on Omeprazole 20 mg BID.     Patient then saw SLP through [facility] on 08/03, underwent videofluoroscopic swallow study (VFSS), which showed no aspiration and only moderate outpouching of contrast along the posterior midesophagus from likely diverticulum remnant.     ROS:    No fevers or chills  No weight loss  No blurry vision, double vision or change in vision  No sore throat  No lymphadenopathy  No headache, paraesthesias, or weakness in a limb  No shortness of breath or wheezing  No chest pain or pressure  No arthralgias or myalgias  No rashes or skin changes  No odynophagia or dysphagia  No BRBPR, hematochezia, melena  No dysuria, frequency or urgency  No hot/cold intolerance or polyria  No anxiety or depression      PERTINENT RELEVANT IMAGING OR LABS:    VFSS 08/03/2022    Therapist Assessment   "  VFSS results: Patient presents with functional oropharyngeal swallow. No penetration or aspiration appreciated on any trials during this study. Patient was given trials of thin liquids via spoon and cup as well as pudding and cracker. Oral Phase Findings: WNL for bolus manipulation, formation, transit and control.  Timely initiation of pharyngeal swallow. Pharyngeal Phase Findings: Laryngeal excursion watson, strong and complete. An incomplete inversion of epiglottis noted though functional.Trace to mild pharyngeal residual noted with pudding and cracker.      Per Dr. Yin's note:        \"Impression:   1. No aspiration or penetration please refer to the speech pathologist`s report for full details.   2. Moderate outpouching of contrast along the posterior midesophagus which may represent contrast within Zenker`s diverticulum/diverticulum remnant.\"     Esophagram 06/27/2023    IMPRESSION:     1. Limited exam due to rudi aspiration of water-soluble contrast.  Small focal outpouching along the anterior aspect of surgical  diverticulectomy site is suspicious for a contained leak. No overt  free leak demonstrated. Consider short-interval follow-up for  reassessment.      2. Irregular small focal outpouching along the surgical site  posteriorly is favored to represent opacification of Zenker's  diverticulum remnant.     ALLERGIES:     Allergies   Allergen Reactions    Penicillins Rash    Sulfa Antibiotics     Codeine Nausea and Unknown    Morphine Nausea and Other (See Comments)       PERTINENT MEDICATIONS:    Current Outpatient Medications:     Cranberry 400 MG CAPS, Take 1 capsule by mouth daily, Disp: , Rfl:     levothyroxine (SYNTHROID/LEVOTHROID) 75 MCG tablet, Take 75 mcg by mouth daily, Disp: , Rfl:     melatonin 5 MG tablet, Take 5 mg by mouth nightly as needed, Disp: , Rfl:     Multiple Vitamins-Minerals (PRESERVISION AREDS) TABS, Take 1 tablet by mouth daily, Disp: , Rfl:     omeprazole (PRILOSEC) 20 MG DR" capsule, Take 1 capsule (20 mg) by mouth 2 times daily, Disp: 60 capsule, Rfl: 0    PROBLEM LIST  Patient Active Problem List    Diagnosis Date Noted    Zenker diverticulum 06/28/2023     Priority: Medium    Zenker's diverticulum 06/26/2023     Priority: Medium    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 10/11/2021     Priority: Medium    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 03/03/2021     Priority: Medium    Actinic keratosis 01/19/2021     Priority: Medium    Blepharitis of both eyes 04/28/2015     Priority: Medium    Intermediate stage dry age-related macular degeneration of both eyes 04/28/2015     Priority: Medium    PVD (posterior vitreous detachment), both eyes 04/29/2014     Priority: Medium    Hypothyroidism 03/22/2007     Priority: Medium       PERTINENT PAST MEDICAL HISTORY:  Past Medical History:   Diagnosis Date    Zenker's diverticulum        PREVIOUS SURGERIES:  Past Surgical History:   Procedure Laterality Date    ESOPHAGOSCOPY, GASTROSCOPY, DUODENOSCOPY (EGD), COMBINED N/A 6/26/2023    Procedure: ESOPHAGOGASTRODUODENOSCOPY (EGD)  with Zenker's diverticulotomy;  Surgeon: Patrick Edouard MD;  Location: [facility]    GYN SURGERY      INSERT TUBE NASOGASTRIC N/A 6/26/2023    Procedure: INSERTION, NASOGASTRIC TUBE Latex Free;  Surgeon: Patrick Edouard MD;  [facility]       SOCIAL HISTORY:  Social History     Socioeconomic History    Marital status:      Spouse name: Not on file    Number of children: Not on file    Years of education: Not on file    Highest education level: Not on file   Occupational History    Not on file   Tobacco Use    Smoking status: Never    Smokeless tobacco: Never   Vaping Use    Vaping Use: Never used   Substance and Sexual Activity    Alcohol use: Never    Drug use: Never    Sexual activity: Not on file   Other Topics Concern    Not on file   Social History Narrative    Not on file     Social Determinants of Health     Financial  Resource Strain: Not on file   Food Insecurity: Not on file   Transportation Needs: Not on file   Physical Activity: Not on file   Stress: Not on file   Social Connections: Not on file   Intimate Partner Violence: Not on file   Housing Stability: Not on file       FAMILY HISTORY:  No family history on file.    Past/family/social history reviewed and no changes    PHYSICAL EXAMINATION:  Constitutional: aaox3, cooperative, pleasant, not dyspneic/diaphoretic, no acute distress  Vitals reviewed: There were no vitals taken for this visit.  Wt:   Wt Readings from Last 2 Encounters:   06/26/23 53.5 kg (117 lb 15.1 oz)   06/07/23 53 kg (116 lb 14.4 oz)      Eyes: Sclera anicteric/injected  Respiratory: Unlabored breathing  Skin: no jaundice  Psych: Normal affect           Again, thank you for allowing me to participate in the care of your patient.      Sincerely,    Patrick Edouard MD

## 2023-08-09 NOTE — NURSING NOTE
Is the patient currently in the state of [place]? YES    Visit mode:VIDEO    If the visit is dropped, the patient can be reconnected by: VIDEO VISIT: Text to cell phone: [phone]    Will anyone else be joining the visit? NO      How would you like to obtain your AVS? MyChart    Are changes needed to the allergy or medication list? NO    Reason for visit: RECHECK (Follow up)

## 2023-08-09 NOTE — PROGRESS NOTES
I, Patrick Edouard MD, was with the fellow, Shaan Hermosillo MD, on this video visit (critical and key portions or time) on 08/9/23 and agree with the fellow's findings and plan of care as documented in the fellow's note.I reviewed the note and there are no changes to the past medical, family or social history.    I spent an additional 15 minutes of non face-to-face time in review of the patients medical records to date. The included review of prior hospital visits, imaging, labs, and procedures. The findings are summarized in my note below.     Clinical resolution of symptoms following intervention. Can discontinue omeprazole and see if she needs it if heartburn/reflux occur. Repeat swallow study showed no aspiration. Follow up as needed if symptom recurrence.     Patrick Edouard MD  [facility]  Division of Gastroenterology and Hepatology  [facility] [address] [id number]    GI CLINIC VISIT    CC/REFERRING MD:  No ref. provider found  REASON FOR CONSULTATION:   No ref. provider found for   Chief Complaint   Patient presents with    RECHECK     Follow up       ASSESSMENT/PLAN:    Ms. Romero is an 88 years old F with PMH of GERD, large hiatal hernia, prior Zenker's diverticulum s/p diverticulotomy, hypothyroidism who presents to GI clinic through virtual visit for follow-up after recent hospitalization.    #Zenker's diverticulum s/p diverticulotomy    Patient presents for follow-up after recent hospitalization. Symptoms have now resolved, patient able to tolerate regular diet, weight has been stable, no issues with nutrition. Recent OFSS by SLP on 08/03 showed no aspiration or issues with swallowing. At this time, no further follow-up is required, unless patient develops any symptoms.       RTC PRN    This note was created with voice recognition software, and while reviewed for accuracy, typos may remain.     Shaan Hermosillo MD  Gastroenterology Fellow  PGY-4  Division of Gastroenterology, Hepatology and Nutrition  [facility]      HPI    Ms. Romero is an 88 years old F with PMH of GERD, large hiatal hernia, prior Zenker's diverticulum s/p diverticulotomy, hypothyroidism who presents to GI clinic through virtual visit for follow-up after recent hospitalization.    Recently underwent Zenker's diverticulotomy on 06/26 by Dr. Edouard. Patient then remained in the hospital for a couple more days due to concern for leak, which was proven to likely be contrast in residual pouch and not extravasation. Also with some concern for aspiration on follow-up esophagram, with SLP following. Eventually had bedside swallow eval by SLP which was successful, diet was advanced to full liquid diet. Patient was then discharged with dietary instructions, eventually advanced to regular diet on 07/04. Also started on Omeprazole 20 mg BID.     Patient then saw SLP through [facility] on 08/03, underwent videofluoroscopic swallow study (VFSS), which showed no aspiration and only moderate outpouching of contrast along the posterior midesophagus from likely diverticulum remnant.     ROS:    No fevers or chills  No weight loss  No blurry vision, double vision or change in vision  No sore throat  No lymphadenopathy  No headache, paraesthesias, or weakness in a limb  No shortness of breath or wheezing  No chest pain or pressure  No arthralgias or myalgias  No rashes or skin changes  No odynophagia or dysphagia  No BRBPR, hematochezia, melena  No dysuria, frequency or urgency  No hot/cold intolerance or polyria  No anxiety or depression      PERTINENT RELEVANT IMAGING OR LABS:    VFSS 08/03/2022    Therapist Assessment    VFSS results: Patient presents with functional oropharyngeal swallow. No penetration or aspiration appreciated on any trials during this study. Patient was given trials of thin liquids via spoon and cup as well as pudding and cracker. Oral Phase Findings: WNL  "for bolus manipulation, formation, transit and control.  Timely initiation of pharyngeal swallow. Pharyngeal Phase Findings: Laryngeal excursion watson, strong and complete. An incomplete inversion of epiglottis noted though functional.Trace to mild pharyngeal residual noted with pudding and cracker.      Per Dr. Yin's note:        \"Impression:   1. No aspiration or penetration please refer to the speech pathologist`s report for full details.   2. Moderate outpouching of contrast along the posterior midesophagus which may represent contrast within Zenker`s diverticulum/diverticulum remnant.\"     Esophagram 06/27/2023    IMPRESSION:     1. Limited exam due to rudi aspiration of water-soluble contrast.  Small focal outpouching along the anterior aspect of surgical  diverticulectomy site is suspicious for a contained leak. No overt  free leak demonstrated. Consider short-interval follow-up for  reassessment.      2. Irregular small focal outpouching along the surgical site  posteriorly is favored to represent opacification of Zenker's  diverticulum remnant.     ALLERGIES:     Allergies   Allergen Reactions    Penicillins Rash    Sulfa Antibiotics     Codeine Nausea and Unknown    Morphine Nausea and Other (See Comments)       PERTINENT MEDICATIONS:    Current Outpatient Medications:     Cranberry 400 MG CAPS, Take 1 capsule by mouth daily, Disp: , Rfl:     levothyroxine (SYNTHROID/LEVOTHROID) 75 MCG tablet, Take 75 mcg by mouth daily, Disp: , Rfl:     melatonin 5 MG tablet, Take 5 mg by mouth nightly as needed, Disp: , Rfl:     Multiple Vitamins-Minerals (PRESERVISION AREDS) TABS, Take 1 tablet by mouth daily, Disp: , Rfl:     omeprazole (PRILOSEC) 20 MG DR capsule, Take 1 capsule (20 mg) by mouth 2 times daily, Disp: 60 capsule, Rfl: 0    PROBLEM LIST  Patient Active Problem List    Diagnosis Date Noted    Zenker diverticulum 06/28/2023     Priority: Medium    Zenker's diverticulum 06/26/2023     Priority: Medium "    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 10/11/2021     Priority: Medium    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 03/03/2021     Priority: Medium    Actinic keratosis 01/19/2021     Priority: Medium    Blepharitis of both eyes 04/28/2015     Priority: Medium    Intermediate stage dry age-related macular degeneration of both eyes 04/28/2015     Priority: Medium    PVD (posterior vitreous detachment), both eyes 04/29/2014     Priority: Medium    Hypothyroidism 03/22/2007     Priority: Medium       PERTINENT PAST MEDICAL HISTORY:  Past Medical History:   Diagnosis Date    Zenker's diverticulum        PREVIOUS SURGERIES:  Past Surgical History:   Procedure Laterality Date    ESOPHAGOSCOPY, GASTROSCOPY, DUODENOSCOPY (EGD), COMBINED N/A 6/26/2023    Procedure: ESOPHAGOGASTRODUODENOSCOPY (EGD)  with Zenker's diverticulotomy;  Surgeon: Patrick Edouard MD;  [facility]    GYN SURGERY      INSERT TUBE NASOGASTRIC N/A 6/26/2023    Procedure: INSERTION, NASOGASTRIC TUBE Latex Free;  Surgeon: Patrick Edouard MD;  [facility]       SOCIAL HISTORY:  Social History     Socioeconomic History    Marital status:      Spouse name: Not on file    Number of children: Not on file    Years of education: Not on file    Highest education level: Not on file   Occupational History    Not on file   Tobacco Use    Smoking status: Never    Smokeless tobacco: Never   Vaping Use    Vaping Use: Never used   Substance and Sexual Activity    Alcohol use: Never    Drug use: Never    Sexual activity: Not on file   Other Topics Concern    Not on file   Social History Narrative    Not on file     Social Determinants of Health     Financial Resource Strain: Not on file   Food Insecurity: Not on file   Transportation Needs: Not on file   Physical Activity: Not on file   Stress: Not on file   Social Connections: Not on file   Intimate Partner Violence: Not on file   Housing Stability: Not on file        FAMILY HISTORY:  No family history on file.    Past/family/social history reviewed and no changes    PHYSICAL EXAMINATION:  Constitutional: aaox3, cooperative, pleasant, not dyspneic/diaphoretic, no acute distress  Vitals reviewed: There were no vitals taken for this visit.  Wt:   Wt Readings from Last 2 Encounters:   06/26/23 53.5 kg (117 lb 15.1 oz)   06/07/23 53 kg (116 lb 14.4 oz)      Eyes: Sclera anicteric/injected  Respiratory: Unlabored breathing  Skin: no jaundice  Psych: Normal affect

## 2023-08-09 NOTE — PROGRESS NOTES
Virtual Visit Details    Type of service:  Video Visit   Video Start Time: 7:33 AM  Video End Time: 7:59 AM    Originating Location (pt. Location): Home    Distant Location (provider location):  On-site  Platform used for Video Visit: Serafin

## 2023-08-10 NOTE — PATIENT INSTRUCTIONS
You will find a brief summary of your discussion and care plan from today's visit below.  Dr Edouard has outlined the following steps after your recent clinic visit:    - No further follow-up is required, unless you develop any symptoms.      Please call with any questions or concerns regarding your clinic visit today.     It is a pleasure being involved in your health care.     Contacts post-consultation depending on your need:     Schedule Clinic Appointments                        [phone], option 1    Renetta Tejeda RN Care Coordinator           [phone]     [facility] OR                           [phone]     GI Procedure Scheduling                               [phone], option 2     For urgent/emergent questions after business hours, you may reach the on-call GI Fellow by contacting the [facility]  at [phone].    How to I schedule a follow-up visit?  If you did not schedule a follow-up visit today, please call [phone] option #1 to schedule a follow-up office visit.       How do I schedule labs, imaging studies, or procedures that were ordered in clinic today?      Labs: To schedule lab appointment at the [facility], use my chart or call [phone]. If you have a [facility] lab closer to home where you are regularly seen you can give them a call.      Procedures: If a colonoscopy, upper endoscopy, breath test, esophageal manometry, or pH impedence was ordered today, our endoscopy team will call you to schedule this. If you have not heard from our endoscopy team within a week, please call [phone] to schedule.      Imaging Studies: If you were scheduled for a CT scan, X-ray, MRI, ultrasound, HIDA scan or other imaging study, please call [phone] to have this scheduled.      Referral: If a referral to another specialty was ordered, expect a phone call or follow instructions above. If you have not heard from anyone regarding your  referral in a week, please call our clinic to check the status.     I recommend signing up for [employer] access if you have not already done so and are comfortable with using a computer.  This allows for online access to your lab results and also helps you communicate efficiently with the clinic should any questions arise in your care.

## 2024-03-10 ENCOUNTER — HEALTH MAINTENANCE LETTER (OUTPATIENT)
Age: 89
End: 2024-03-10

## 2025-03-16 ENCOUNTER — HEALTH MAINTENANCE LETTER (OUTPATIENT)
Age: 89
End: 2025-03-16

## (undated) DEVICE — ENDO CAP AND TUBING STERILE FOR ENDOGATOR  100130

## (undated) DEVICE — SOL NACL 0.9% IRRIG 1000ML BOTTLE 2F7124

## (undated) DEVICE — PACK ENDOSCOPY GI CUSTOM UMMC

## (undated) DEVICE — PAD CHUX UNDERPAD 23X24" 7136

## (undated) DEVICE — TUBE NASOGASTRIC FEEDING CORFLO ENFIT 12FRX22" 50-4602

## (undated) DEVICE — WIRE GUIDE AMPLATZ SUPER STIFF 0.035"X260CM M001465261

## (undated) DEVICE — DEVICE BRIDLE PRO NASAL 12FR 4-4212

## (undated) DEVICE — KIT CONNECTOR FOR OLYMPUS ENDOSCOPES DEFENDO 100310

## (undated) DEVICE — ENDO TUBING CO2 SMARTCAP STERILE DISP 100145CO2EXT

## (undated) DEVICE — SUCTION MANIFOLD NEPTUNE 2 SYS 4 PORT 0702-020-000

## (undated) DEVICE — CLIP HEMOSTATIC RESOLN DURACLIP DC0235

## (undated) DEVICE — ESU KNIFE SB STANDARD 1.8 MD-47706

## (undated) DEVICE — ATTACHMENT CAP DISTAL REVEAL 11.8MM BX00711771

## (undated) DEVICE — KIT ENDO FIRST STEP DISINFECTANT 200ML W/POUCH EP-4

## (undated) DEVICE — ENDO BITE BLOCK ADULT OMNI-BLOC

## (undated) DEVICE — SOL WATER IRRIG 1000ML BOTTLE 2F7114

## (undated) RX ORDER — CALCIUM CHLORIDE 100 MG/ML
INJECTION INTRAVENOUS; INTRAVENTRICULAR
Status: DISPENSED
Start: 2023-06-26

## (undated) RX ORDER — PROPOFOL 10 MG/ML
INJECTION, EMULSION INTRAVENOUS
Status: DISPENSED
Start: 2023-06-26

## (undated) RX ORDER — FENTANYL CITRATE 50 UG/ML
INJECTION, SOLUTION INTRAMUSCULAR; INTRAVENOUS
Status: DISPENSED
Start: 2023-06-26

## (undated) RX ORDER — ONDANSETRON 2 MG/ML
INJECTION INTRAMUSCULAR; INTRAVENOUS
Status: DISPENSED
Start: 2023-06-26

## (undated) RX ORDER — FENTANYL CITRATE-0.9 % NACL/PF 10 MCG/ML
PLASTIC BAG, INJECTION (ML) INTRAVENOUS
Status: DISPENSED
Start: 2023-06-26